# Patient Record
Sex: MALE | Race: WHITE | NOT HISPANIC OR LATINO | Employment: UNEMPLOYED | URBAN - METROPOLITAN AREA
[De-identification: names, ages, dates, MRNs, and addresses within clinical notes are randomized per-mention and may not be internally consistent; named-entity substitution may affect disease eponyms.]

---

## 2020-01-20 PROBLEM — G40.89 OTHER SEIZURES: Status: ACTIVE | Noted: 2020-01-20

## 2024-04-11 PROBLEM — R56.9 SEIZURE-LIKE ACTIVITY: Status: ACTIVE | Noted: 2024-04-11

## 2024-04-12 PROBLEM — G40.909 SEIZURE DISORDER: Status: ACTIVE | Noted: 2020-01-20

## 2024-05-28 ENCOUNTER — TELEPHONE (OUTPATIENT)
Dept: NEUROLOGY | Facility: CLINIC | Age: 39
End: 2024-05-28
Payer: COMMERCIAL

## 2024-05-28 NOTE — TELEPHONE ENCOUNTER
Called the aptient to schedule a new patent appointment with Dr. Randhawa. Pr answered and agreed to come on 6/12 at 3:30 pm.

## 2024-05-31 ENCOUNTER — TELEPHONE (OUTPATIENT)
Dept: NEUROLOGY | Facility: CLINIC | Age: 39
End: 2024-05-31
Payer: COMMERCIAL

## 2024-05-31 NOTE — TELEPHONE ENCOUNTER
Called the pt to notify him that we have to cancel his upcoming apt with Sydnee due to her departure from [facility]. Pt did not answer and was not able to leave a message. I canceled the pt and will reach out to r/s soon

## 2024-06-12 ENCOUNTER — OFFICE VISIT (OUTPATIENT)
Dept: NEUROLOGY | Facility: CLINIC | Age: 39
End: 2024-06-12
Payer: COMMERCIAL

## 2024-06-12 DIAGNOSIS — R56.9 SEIZURE-LIKE ACTIVITY: Primary | ICD-10-CM

## 2024-06-12 DIAGNOSIS — G40.909 SEIZURE DISORDER: ICD-10-CM

## 2024-06-12 PROCEDURE — 1160F RVW MEDS BY RX/DR IN RCRD: CPT | Mod: CPTII,S$GLB,, | Performed by: STUDENT IN AN ORGANIZED HEALTH CARE EDUCATION/TRAINING PROGRAM

## 2024-06-12 PROCEDURE — 99212 OFFICE O/P EST SF 10 MIN: CPT | Mod: S$GLB,,, | Performed by: STUDENT IN AN ORGANIZED HEALTH CARE EDUCATION/TRAINING PROGRAM

## 2024-06-12 PROCEDURE — 99999 PR PBB SHADOW E&M-EST. PATIENT-LVL II: CPT | Mod: PBBFAC,,, | Performed by: STUDENT IN AN ORGANIZED HEALTH CARE EDUCATION/TRAINING PROGRAM

## 2024-06-12 PROCEDURE — 1159F MED LIST DOCD IN RCRD: CPT | Mod: CPTII,S$GLB,, | Performed by: STUDENT IN AN ORGANIZED HEALTH CARE EDUCATION/TRAINING PROGRAM

## 2024-06-12 RX ORDER — CARBAMAZEPINE 300 MG/1
300 CAPSULE, EXTENDED RELEASE ORAL 2 TIMES DAILY
Qty: 180 CAPSULE | Refills: 1 | Status: SHIPPED | OUTPATIENT
Start: 2024-06-12

## 2024-06-12 RX ORDER — LEVETIRACETAM 750 MG/1
750 TABLET ORAL 2 TIMES DAILY
Qty: 180 TABLET | Refills: 1 | Status: SHIPPED | OUTPATIENT
Start: 2024-06-12

## 2024-06-12 NOTE — PATIENT INSTRUCTIONS
VISIT FOLLOW UP    It was nice to see you today.  Here is what we discussed at your visit:     I would like to schedule you for an admission in the epilepsy monitoring unit (EMU).  The EMU is a specialized unit in the hospital that only takes care of patients who have seizures or seizure like events.  The goal of an EMU admission is to record an event to better understand what changes are occurring in the brain's electrical activity.  This allows us to identify whether a patient's events are epileptic seizures or another type of event.  If the events are seizures, we can also learn what area of the brain the seizures are starting from.      EMU admissions are typically 3-5 days, although they can be as long as 7 days.  During the admission, we will typically stop or lower any anti-seizure medications you are taking in order to increase the chance of recording a seizure.  This is done in a safe environment where you are being monitored 24 hours/day, so it is safer to discontinue seizure medications inpatient rather than at home.  I feel this admission is necessary to better understand and treat your seizures.  Our epilepsy nurse will contact you to schedule this admission.     Continue keppra 750 mg twice a day and carbamazepine 300 mg twice a day.  Follow up after EMU admission    Dr. BENJAMIN's contact information: office phone [phone], or contact via [employer]    Seizure precautions:    For emergencies, please call 911 or proceed directly to the nearest emergency room only if you can safely do so.    Seizures may happen at any time. It is important to take certain precautions to maintain your safety.     You should not drive unless you have been cleared by your physicians as well as the [facility].     When possible, take showers instead of baths, as it is possible to drown in even shallow water during a seizure. Do not swim unsupervised or in open water where rescue could be difficult. Do not climb to heights and  do not operate heavy machinery. When cooking, use the back burners of the stove and avoid open flames or hot stove tops. Avoid any activities which could be dangerous in the event of a loss of consciousness.

## 2024-06-12 NOTE — PROGRESS NOTES
[facility]  Epilepsy Clinic Initial Consult Note    [facility] - NEUROLOGY 7TH FL  [facility], [place]    Date: 6/12/24  Patient Name: Abilio Lorenzana   MRN: [id number]   PCP: Aaliyah, Primary Doctor  Referring Provider: Areli Mitchell MD    Assessment:     4 Dimensional Epilepsy Classification  Paroxysmal events  Semiology: (1) Staring episodes (2) Generalized convulsive events  Epileptogenic zone: unknown  Etiology: Epileptic seizures vs nonepileptic spells  Co-morbidities: ADHD       This is Abilio Lorenzana, 38 y.o. male who presents for evaluation of seizure like activity.  He reports a long history of seizure like events that have persisted and even escalated in frequency despite increasing doses of anti seizure medications.  A recent admission during which he had a 24 hour video EEG was normal, although antiseizure medications were not stopped during this EEG.  Due to increasing frequency of seizure-like events as well as uncertain diagnosis with prior EEG studies being normal, and EMU admission is indicated.  Discussed at length with the patient who was agreeable to study.  Referral placed.  We will continue Keppra/Carbamazepine in the meanwhile.  RTC after EMU completed.    Plan:      Problem List Items Addressed This Visit          Neuro    Seizure disorder    Seizure-like activity - Primary    Current Assessment & Plan     Semiology is somewhat limited, however multiple factors (frequent events, prolonged events) are concerning for NEE.  Refer to EMU for further evaluation.                I completed education on seizure first aid and safety. I recommended seizure precautions with regards to avoiding unsupervised water recreational activity or bathing in tubs, climbing or working at heights, operation of heavy or dangerous machinery, caution around fire and sources of high heat, as well as any other activity which could put a patient at danger in case of a seizure.  I  "also reviewed the [place] DMV law and recommended no driving.    Pastora Amaya MD  [facility]   Department of Neurology    Patient note was created using [employer]ict[employer].  Any errors in syntax or even information may not have been identified and edited on initial review prior to signing this note.  Subjective:   Patient seen in consultation at the request of Areli Mitchell MD for the evaluation of seizures. A copy of this note will be sent to the referring physician.          HPI:   Mr. Abilio Lorenzana is a 38 y.o. male who presents with a chief complaint of seizures.  The patient is accompanied at this visit by his mother and sister.      Onset: 2011  Description:   - First episode working in [employer], reports he started to feel lightheaded and short of breath.  He sat down and then remembers falling backwards, then his next memory is waking up in the hospital.  Since that time he has had recurrent episodes at least on a monthly basis and more frequent recently.     - Reports episodes vary in terms of severity.  Sometimes he will have tingling all over his body and then he will lose consciousness.  Episodes can be 60 seconds or 5 minutes long or he can cluster.      - Witnesses have described his body "going like jelly" - falling down to the ground.  No tongue bite or loss of control of bladder    - wakes up disoriented, tries to recall what happened but can't, may remember bits and pieces before the seizure.  He feels disoriented until the next day and exhausted.      - Mother describes: falls down, can't sit up right.  Sometimes knows they are coming on, sometimes he doesn't.  Body can stiffen up and sometimes flail.  Eyes are sometimes open and sometimes closed.  Sometimes he also has staring episodes, like he isn't there, then he goes into convulsions.    Frequency: at least 1x per month.  Frequency has decreased since he stopped working - December 2023 - happening every 2 weeks.  May have up to 20 " consecutive seizures in a day, lasting 60 seconds at a time.    Longest period seizure free: 1-1.5 months  Last seizure: last month.   Last episode he had at doctor's office does not remember at all.  He was [facility] for urgent 24 hour EEG after this episode which was normal.    Seizure Triggers/ Provoking Features: sleep deprivation and stress  Previous Seizure Medications: none prior  Current Seizure Medications: Keppra 750 mg BID - has run out.  Carbamazepine 300 mg BID.      Handedness: left  Seizure Onset Age: 32  Seizure/ Epilepsy Risk Factors: none  Birth/Developmental History: Birth normal, development - ADHD, normal, reports average performance in school  Seizure related injuries: none  Psychiatric/Behavioral Comorbitidies: none diagnosed  Surgical Candidacy:     Social history:  Previously employed at [employer] and more recently [employer]  Stopped working in December due to seizures.    Not driving  Vapes     ROS:   Anxiety - increased stressors related to work  Family hx: myotonic dystrophy in granddaughter and sister and brother.  No hx of epilepsy    Diagnostics:  vEEG 24 hours 4/2024 - normal  MRI brain epilepsy protocol: 1/14/2020 normal      PAST MEDICAL HISTORY:  Past Medical History:   Diagnosis Date    Seizures        PAST SURGICAL HISTORY:  No past surgical history on file.    CURRENT MEDS:  Current Outpatient Medications   Medication Sig Dispense Refill    carBAMazepine (CARBATROL) 300 MG CM12 Take 300 mg by mouth 2 (two) times daily.      levETIRAcetam (KEPPRA) 750 MG Tab Take 750 mg by mouth 2 (two) times daily.       No current facility-administered medications for this visit.       ALLERGIES:  Review of patient's allergies indicates:  No Known Allergies    FAMILY HISTORY:  No family history on file.    SOCIAL HISTORY:  Social History     Tobacco Use    Smoking status: Every Day     Current packs/day: 0.25     Types: Cigarettes    Smokeless tobacco: Never    Substance Use Topics    Alcohol use: Not Currently        Review of Systems:  12 system review of systems is negative except for the symptoms mentioned in HPI.        Objective:   There were no vitals filed for this visit.    General: NAD, well nourished   Eyes: no tearing, discharge, no erythema   ENT: moist mucous membranes of the oral cavity, nares patent    Neck: Supple, Full range of motion  Cardiovascular: Warm and well perfused, pulses equal and symmetrical  Lungs: Normal work of breathing, normal chest wall excursions  Skin: No rash, lesions, or breakdown on exposed skin  Psychiatry: Mood and affect are appropriate   Abdomen: soft, non tender, non distended  Extremeties: No cyanosis, clubbing or edema.    Neurological   MENTAL STATUS: Alert and oriented to person, place, and time. Attention and concentration within normal limits. Speech without dysarthria, able to name and repeat without difficulty. Recent and remote memory within normal limits   CRANIAL NERVES: Visual fields intact. PERRL. EOMI. Facial sensation intact. Face symmetrical. Hearing grossly intact. Full shoulder shrug bilaterally. Tongue protrudes midline   SENSORY: Sensation is intact to light touch throughout.  Joint position perception intact. Negative Romberg.   MOTOR: Normal bulk and tone. No pronator drift.  5/5 deltoid, biceps, triceps, interosseous, hand  bilaterally. 5/5 iliopsoas, knee extension/flexion, foot dorsi/plantarflexion bilaterally.    REFLEXES: Symmetric and 2+ throughout. Toes down going bilaterally.   CEREBELLAR/COORDINATION/GAIT: Gait steady with normal arm swing and stride length.  Heel to shin intact. Finger to nose intact. Normal rapid alternating movements.

## 2024-06-12 NOTE — ASSESSMENT & PLAN NOTE
Semiology is somewhat limited, however multiple factors (frequent events, prolonged events) are concerning for NEE.  Refer to EMU for further evaluation.

## 2024-07-01 ENCOUNTER — PATIENT MESSAGE (OUTPATIENT)
Dept: NEUROLOGY | Facility: CLINIC | Age: 39
End: 2024-07-01
Payer: COMMERCIAL

## 2024-07-02 ENCOUNTER — TELEPHONE (OUTPATIENT)
Dept: NEUROLOGY | Facility: CLINIC | Age: 39
End: 2024-07-02
Payer: COMMERCIAL

## 2024-07-02 DIAGNOSIS — R56.9 SEIZURE-LIKE ACTIVITY: Primary | ICD-10-CM

## 2024-07-02 NOTE — TELEPHONE ENCOUNTER
----- Message from Brittaney Nichole RN sent at 7/2/2024 11:19 AM CDT -----  Regarding: RE: EEG  There are no EMU orders on this patient, only an ambulatory  ----- Message -----  From: Jacquie Orozco  Sent: 7/2/2024  11:12 AM CDT  To: Brittaney Nichole RN  Subject: RE: EEG                                          Hey,    This looks like EMU as well according to Dr BENJAMIN's last clinic note.    Jacquie  ----- Message -----  From: Lola Dlaey MA  Sent: 7/2/2024   9:56 AM CDT  To: Jacquie Orozco  Subject: FW: EEG                                          Please Advise.  Pt need AMB EEG.  ----- Message -----  From: Brittaney Nichole RN  Sent: 7/2/2024   7:22 AM CDT  To: Lola Daley MA  Subject: RE: EEG                                          I do not schedule EEGs. If it's ambulatory which is what looks like is ordered, you should send to [facility]  ----- Message -----  From: Lola Daley MA  Sent: 7/1/2024   1:23 PM CDT  To: Brittaney Nichole RN  Subject: EEG                                              pT NEEDS AN eeg SCHEDULED ANSWERING SERVICE SENT TO ME BUT I CAN'T HEKP HIM.

## 2024-07-22 ENCOUNTER — TELEPHONE (OUTPATIENT)
Dept: NEUROLOGY | Facility: CLINIC | Age: 39
End: 2024-07-22
Payer: COMMERCIAL

## 2024-07-22 DIAGNOSIS — R56.9 SEIZURE-LIKE ACTIVITY: Primary | ICD-10-CM

## 2024-07-22 NOTE — TELEPHONE ENCOUNTER
Scheduled EMU via patient's mother, 8/6/24, 1pm. Aware an adult is required to accompany him for the duration including overnight. Aware he will be connected to lines to his head, chest, arm, have an IV placed; will not be able to leave the room until all equipment is removed at discharge. She reports he does smoke/vape; is aware there is absolutely no smoking or vaping in the hospital and can request a nicotine patch. Instructions thoroughly discussed; mailed via [employer]

## 2024-08-06 ENCOUNTER — HOSPITAL ENCOUNTER (INPATIENT)
Facility: HOSPITAL | Age: 39
LOS: 6 days | Discharge: HOME | DRG: 880 | End: 2024-08-12
Attending: STUDENT IN AN ORGANIZED HEALTH CARE EDUCATION/TRAINING PROGRAM | Admitting: PSYCHIATRY & NEUROLOGY
Payer: COMMERCIAL

## 2024-08-06 ENCOUNTER — DOCUMENTATION ONLY (OUTPATIENT)
Dept: NEUROLOGY | Facility: CLINIC | Age: 39
End: 2024-08-06
Payer: COMMERCIAL

## 2024-08-06 DIAGNOSIS — R56.9 SEIZURE: ICD-10-CM

## 2024-08-06 DIAGNOSIS — F44.9 CONVERSION DISORDER: Primary | ICD-10-CM

## 2024-08-06 DIAGNOSIS — R56.9 SEIZURE-LIKE ACTIVITY: ICD-10-CM

## 2024-08-06 PROBLEM — F17.200 NICOTINE DEPENDENCE: Status: ACTIVE | Noted: 2024-08-06

## 2024-08-06 LAB
ALBUMIN SERPL BCP-MCNC: 3.8 G/DL (ref 3.5–5.2)
ALP SERPL-CCNC: 51 U/L (ref 55–135)
ALT SERPL W/O P-5'-P-CCNC: 12 U/L (ref 10–44)
AMPHET+METHAMPHET UR QL: NEGATIVE
ANION GAP SERPL CALC-SCNC: 6 MMOL/L (ref 8–16)
AST SERPL-CCNC: 15 U/L (ref 10–40)
BARBITURATES UR QL SCN>200 NG/ML: NEGATIVE
BASOPHILS # BLD AUTO: 0.04 K/UL (ref 0–0.2)
BASOPHILS NFR BLD: 0.8 % (ref 0–1.9)
BENZODIAZ UR QL SCN>200 NG/ML: NEGATIVE
BILIRUB SERPL-MCNC: 0.5 MG/DL (ref 0.1–1)
BUN SERPL-MCNC: 8 MG/DL (ref 6–20)
BZE UR QL SCN: NEGATIVE
CALCIUM SERPL-MCNC: 9.2 MG/DL (ref 8.7–10.5)
CANNABINOIDS UR QL SCN: NEGATIVE
CARBAMAZEPINE SERPL-MCNC: <1.9 UG/ML (ref 4–12)
CHLORIDE SERPL-SCNC: 112 MMOL/L (ref 95–110)
CO2 SERPL-SCNC: 25 MMOL/L (ref 23–29)
CREAT SERPL-MCNC: 0.8 MG/DL (ref 0.5–1.4)
CREAT UR-MCNC: 42 MG/DL (ref 23–375)
DIFFERENTIAL METHOD BLD: ABNORMAL
EOSINOPHIL # BLD AUTO: 0.1 K/UL (ref 0–0.5)
EOSINOPHIL NFR BLD: 1.2 % (ref 0–8)
ERYTHROCYTE [DISTWIDTH] IN BLOOD BY AUTOMATED COUNT: 12 % (ref 11.5–14.5)
EST. GFR  (NO RACE VARIABLE): >60 ML/MIN/1.73 M^2
ETHANOL UR-MCNC: <10 MG/DL
GLUCOSE SERPL-MCNC: 79 MG/DL (ref 70–110)
HCT VFR BLD AUTO: 40.8 % (ref 40–54)
HGB BLD-MCNC: 12.9 G/DL (ref 14–18)
IMM GRANULOCYTES # BLD AUTO: 0.02 K/UL (ref 0–0.04)
IMM GRANULOCYTES NFR BLD AUTO: 0.4 % (ref 0–0.5)
LYMPHOCYTES # BLD AUTO: 1.3 K/UL (ref 1–4.8)
LYMPHOCYTES NFR BLD: 25.3 % (ref 18–48)
MAGNESIUM SERPL-MCNC: 2.1 MG/DL (ref 1.6–2.6)
MCH RBC QN AUTO: 32.3 PG (ref 27–31)
MCHC RBC AUTO-ENTMCNC: 31.6 G/DL (ref 32–36)
MCV RBC AUTO: 102 FL (ref 82–98)
METHADONE UR QL SCN>300 NG/ML: NEGATIVE
MONOCYTES # BLD AUTO: 0.3 K/UL (ref 0.3–1)
MONOCYTES NFR BLD: 6.7 % (ref 4–15)
NEUTROPHILS # BLD AUTO: 3.2 K/UL (ref 1.8–7.7)
NEUTROPHILS NFR BLD: 65.6 % (ref 38–73)
NRBC BLD-RTO: 0 /100 WBC
OHS QRS DURATION: 112 MS
OHS QTC CALCULATION: 390 MS
OPIATES UR QL SCN: NEGATIVE
PCP UR QL SCN>25 NG/ML: NEGATIVE
PHOSPHATE SERPL-MCNC: 2.7 MG/DL (ref 2.7–4.5)
PLATELET # BLD AUTO: 227 K/UL (ref 150–450)
PMV BLD AUTO: 9.7 FL (ref 9.2–12.9)
POTASSIUM SERPL-SCNC: 3.9 MMOL/L (ref 3.5–5.1)
PROT SERPL-MCNC: 6 G/DL (ref 6–8.4)
RBC # BLD AUTO: 4 M/UL (ref 4.6–6.2)
SODIUM SERPL-SCNC: 143 MMOL/L (ref 136–145)
TOXICOLOGY INFORMATION: NORMAL
WBC # BLD AUTO: 4.94 K/UL (ref 3.9–12.7)

## 2024-08-06 PROCEDURE — 80156 ASSAY CARBAMAZEPINE TOTAL: CPT

## 2024-08-06 PROCEDURE — 93010 ELECTROCARDIOGRAM REPORT: CPT | Mod: ,,, | Performed by: INTERNAL MEDICINE

## 2024-08-06 PROCEDURE — 85025 COMPLETE CBC W/AUTO DIFF WBC: CPT

## 2024-08-06 PROCEDURE — 95720 EEG PHY/QHP EA INCR W/VEEG: CPT | Mod: ,,, | Performed by: PSYCHIATRY & NEUROLOGY

## 2024-08-06 PROCEDURE — 84100 ASSAY OF PHOSPHORUS: CPT

## 2024-08-06 PROCEDURE — 95700 EEG CONT REC W/VID EEG TECH: CPT

## 2024-08-06 PROCEDURE — 36415 COLL VENOUS BLD VENIPUNCTURE: CPT

## 2024-08-06 PROCEDURE — 11000001 HC ACUTE MED/SURG PRIVATE ROOM

## 2024-08-06 PROCEDURE — 83735 ASSAY OF MAGNESIUM: CPT

## 2024-08-06 PROCEDURE — 80053 COMPREHEN METABOLIC PANEL: CPT

## 2024-08-06 PROCEDURE — 80307 DRUG TEST PRSMV CHEM ANLYZR: CPT

## 2024-08-06 PROCEDURE — 95714 VEEG EA 12-26 HR UNMNTR: CPT

## 2024-08-06 PROCEDURE — 80177 DRUG SCRN QUAN LEVETIRACETAM: CPT

## 2024-08-06 PROCEDURE — 93005 ELECTROCARDIOGRAM TRACING: CPT

## 2024-08-06 PROCEDURE — 25000003 PHARM REV CODE 250

## 2024-08-06 RX ORDER — ACETAMINOPHEN 325 MG/1
650 TABLET ORAL EVERY 4 HOURS PRN
Status: DISCONTINUED | OUTPATIENT
Start: 2024-08-06 | End: 2024-08-12 | Stop reason: HOSPADM

## 2024-08-06 RX ORDER — IBUPROFEN 200 MG
1 TABLET ORAL DAILY
Status: DISCONTINUED | OUTPATIENT
Start: 2024-08-07 | End: 2024-08-12 | Stop reason: HOSPADM

## 2024-08-06 RX ORDER — CARBAMAZEPINE 100 MG/1
100 CAPSULE, EXTENDED RELEASE ORAL 2 TIMES DAILY
Status: DISCONTINUED | OUTPATIENT
Start: 2024-08-06 | End: 2024-08-07

## 2024-08-06 RX ORDER — DOCUSATE SODIUM 100 MG/1
100 CAPSULE, LIQUID FILLED ORAL 2 TIMES DAILY PRN
Status: DISCONTINUED | OUTPATIENT
Start: 2024-08-06 | End: 2024-08-12 | Stop reason: HOSPADM

## 2024-08-06 RX ORDER — SODIUM CHLORIDE 0.9 % (FLUSH) 0.9 %
10 SYRINGE (ML) INJECTION
Status: DISCONTINUED | OUTPATIENT
Start: 2024-08-06 | End: 2024-08-12 | Stop reason: HOSPADM

## 2024-08-06 RX ORDER — ONDANSETRON 8 MG/1
8 TABLET, ORALLY DISINTEGRATING ORAL EVERY 8 HOURS PRN
Status: DISCONTINUED | OUTPATIENT
Start: 2024-08-06 | End: 2024-08-12 | Stop reason: HOSPADM

## 2024-08-06 RX ADMIN — CARBAMAZEPINE 100 MG: 100 CAPSULE, EXTENDED RELEASE ORAL at 09:08

## 2024-08-06 NOTE — NURSING
Patient Transferred to NPU Room 905       Upon arrival to the floor, patient greeted and oriented to room. Complete head to toe assessment completed per protocol. VSS, see flowsheet for details. Neuro assessment completed. Primary team notified of patient's transfer to floor. All current and transfer orders reviewed/reconciled per protocol. All emergency equipment set up in patient's room. Fall/seizure precautions initiated per providers orders. 4 Eyes skin assessment performed, see below for details. Reviewed assessment and rounding frequency with patient and family. Questions were encouraged and addressed. Repositioned patient for comfort with bed locked in lowest position, side rails up x 4, bed alarm set, and call light within reach. Instructed patient to call staff for mobility/assistance, verbalized understanding. No acute signs of distress noted at this time.           +++++ Special Considerations+++++++    Nurses Note -- 4 Eyes      8/6/2024   1:29 PM      Skin assessed during: Admit      [x] No Altered Skin Integrity Present    []Prevention Measures Documented      [] Yes- Altered Skin Integrity Present or Discovered   [] LDA Added if Not in Epic (Describe Wound)   [] New Altered Skin Integrity was Present on Admit and Documented in LDA   [] Wound Image Taken    Wound Care Consulted? No    Attending Nurse:  Rohit Mcmahan RN/Staff Member:  John

## 2024-08-06 NOTE — ASSESSMENT & PLAN NOTE
"Abilio Lorenzana is a 38 year old male with a history of smoking who presents per Dr. Amaya for capture of seizure events and optimization of medications. He experiences 1-2 episodes per month where he feels "light headed" and will proceed to pass out and convulse. Prior EEG 4/2024 was normal as well as MRI 1/2020. He is currently on keppra 750 mg BID and carbamazepine 300 mg BID.     Plan:  - Continuous EEG monitoring  - Discontinue keppra  - Continue carbamazepine at 100 mg BID  - CBC, CMP, Mg, P, Utox, AED levels, EKG x 1  - For seizure > 5 minute notify epilepsy on call  - Ativan 2 mg prn  - Oxygen and suction at bedside  - Continuous pulse oximetry   - Telemetry   "

## 2024-08-06 NOTE — NURSING
EMU NURSING INTERVIEW  Pt admitted to EMU room ---, EMU team notified.  Onset-When did your seizures start? 2011  Aura-Do you experience an aura? yes  Symptoms-What symptoms do you experience? Tingling, vision changes, convulsions  Triggers-Do you have any Triggers? unknown  Do you bite your tongue? no  Do become incontinent of bladder or bowels? no  Have you been told your BP or oxygen drops during an event? no    Bed locked, bed alarm on and call light in reach. Educated to call staff before ambulating. ducated to use event button when patient feels like they will have an event or when an event is suspected. Pt and family verbalize understanding.

## 2024-08-06 NOTE — HPI
"Abilio Lorenzana is a 38 year old male with a history of smoking who presents per Dr. Amaya for capture of seizure events and optimization of medications. His first event was back in 2011 when working at [employer]. At the time he felt "lightheaded" and short of breath. He sat down and fell back and the next thing he remembered was waking up in the hospital. Since then he experiences 1-2 events per month. Sometimes he feels tingling all over his body and he is confused until the next day. He denies tongue biting or bowel/bladder incontinence. The last episode was July 26th. He has an EEG 4/2024 which was normal. MRI brain epilepsy protocol 1/14/2020 without acute findings other than left maxillary sinusitis. He is currently on keppra 750 mg BID and carbamazepine 300 mg BID. He took his medications on the morning of arrival.   "

## 2024-08-06 NOTE — H&P
"[facility] ([facility])  Neurology-Epilepsy  History & Physical    Patient Name: Abilio Lorenzana  MRN: [id number]   Admission Date: 8/6/2024  Code Status: Full Code   Attending Provider: Tyree Oneill MD   Primary Care Physician: Aaliyah, Primary Doctor  Principal Problem:Seizure-like activity    Subjective:     Chief Complaint:  Seizure like events     HPI:   Abilio Lorenzana is a 38 year old male with a history of smoking who presents per Dr. Amaya for capture of seizure events and optimization of medications. His first event was back in 2011 when working at [employer]. At the time he felt "lightheaded" and short of breath. He sat down and fell back and the next thing he remembered was waking up in the hospital. Since then he experiences 1-2 events per month. Sometimes he feels tingling all over his body and he is confused until the next day. He denies tongue biting or bowel/bladder incontinence. The last episode was July 26th. He has an EEG 4/2024 which was normal. MRI brain epilepsy protocol 1/14/2020 without acute findings other than left maxillary sinusitis. He is currently on keppra 750 mg BID and carbamazepine 300 mg BID. He took his medications on the morning of arrival.     Past Medical History:   Diagnosis Date    Seizures        No past surgical history on file.    Review of patient's allergies indicates:  No Known Allergies    No current facility-administered medications on file prior to encounter.     Current Outpatient Medications on File Prior to Encounter   Medication Sig    carBAMazepine (CARBATROL) 300 MG CM12 Take 1 capsule (300 mg total) by mouth 2 (two) times daily.    levETIRAcetam (KEPPRA) 750 MG Tab Take 1 tablet (750 mg total) by mouth 2 (two) times daily.     Continuous Infusions:    Family History    None       Tobacco Use    Smoking status: Every Day     Current packs/day: 0.25     Types: Cigarettes    Smokeless tobacco: Never   Substance and Sexual Activity    Alcohol use: Not " Currently    Drug use: Not on file    Sexual activity: Not on file     Review of Systems   Constitutional:  Negative for chills and fever.   HENT:  Negative for rhinorrhea and sneezing.    Respiratory:  Negative for cough and shortness of breath.    Cardiovascular:  Negative for chest pain and leg swelling.   Gastrointestinal:  Negative for abdominal pain and nausea.   Genitourinary:  Negative for dysuria and hematuria.   Musculoskeletal:  Negative for arthralgias and myalgias.   Neurological:  Positive for seizures. Negative for tremors and headaches.   Psychiatric/Behavioral:  Negative for agitation and confusion.      Objective:     Vital Signs (Most Recent):  Temp: 97.8 °F (36.6 °C) (08/06/24 1550)  Pulse: (!) 46 (08/06/24 1550)  Resp: 12 (08/06/24 1550)  BP: 99/60 (08/06/24 1550)  SpO2: 100 % (08/06/24 1550) Vital Signs (24h Range):  Temp:  [97.6 °F (36.4 °C)-97.8 °F (36.6 °C)] 97.8 °F (36.6 °C)  Pulse:  [46-60] 46  Resp:  [12-16] 12  SpO2:  [98 %-100 %] 100 %  BP: ()/(60-63) 99/60     Weight: 60.2 kg (132 lb 11.5 oz)  Body mass index is 19.6 kg/m².     Physical Exam  Vitals and nursing note reviewed.   Constitutional:       General: He is not in acute distress.  HENT:      Head: Normocephalic and atraumatic.      Nose: Nose normal. No rhinorrhea.   Eyes:      Extraocular Movements: Extraocular movements intact.      Conjunctiva/sclera: Conjunctivae normal.   Cardiovascular:      Rate and Rhythm: Normal rate and regular rhythm.   Pulmonary:      Effort: Pulmonary effort is normal. No respiratory distress.   Abdominal:      General: There is no distension.      Palpations: Abdomen is soft.   Musculoskeletal:      Right lower leg: No edema.      Left lower leg: No edema.   Skin:     General: Skin is warm and dry.      Capillary Refill: Capillary refill takes less than 2 seconds.   Neurological:      Mental Status: He is alert and oriented to person, place, and time.      Cranial Nerves: Cranial nerves 2-12  "are intact.      Deep Tendon Reflexes:      Reflex Scores:       Patellar reflexes are 2+ on the right side and 2+ on the left side.           NEUROLOGICAL EXAMINATION:     MENTAL STATUS   Oriented to person, place, and time.     CRANIAL NERVES   Cranial nerves II through XII intact.     MOTOR EXAM   Overall muscle tone: normal    REFLEXES     Reflexes   Right patellar: 2+  Left patellar: 2+    SENSORY EXAM   Light touch normal.     GAIT AND COORDINATION     Tremor   Resting tremor: absent      Significant Labs: CBC:   Recent Labs   Lab 08/06/24  1542   WBC 4.94   HGB 12.9*   HCT 40.8        CMP: No results for input(s): "GLU", "NA", "K", "CL", "CO2", "BUN", "CREATININE", "CALCIUM", "MG", "PROT", "ALBUMIN", "BILITOT", "ALKPHOS", "AST", "ALT", "ANIONGAP", "EGFRNONAA" in the last 48 hours.    Significant Studies: EEG: I have reviewed all pertinent results/findings within the past 24 hours  Assessment and Plan:     * Seizure-like activity  Abilio Lorenzana is a 38 year old male with a history of smoking who presents per Dr. Amaya for capture of seizure events and optimization of medications. He experiences 1-2 episodes per month where he feels "light headed" and will proceed to pass out and convulse. Prior EEG 4/2024 was normal as well as MRI 1/2020. He is currently on keppra 750 mg BID and carbamazepine 300 mg BID.     Plan:  - Continuous EEG monitoring  - Discontinue keppra  - Continue carbamazepine at 100 mg BID  - CBC, CMP, Mg, P, Utox, AED levels, EKG x 1  - For seizure > 5 minute notify epilepsy on call  - Ativan 2 mg prn  - Oxygen and suction at bedside  - Continuous pulse oximetry   - Telemetry     Nicotine dependence  - Discussed smoking cessation and will start a nicotine patch while admitted.         VTE Risk Mitigation (From admission, onward)           Ordered     Place sequential compression device  Until discontinued         08/06/24 1329     IP VTE LOW RISK PATIENT  Once         08/06/24 " 3482                    Loy Strong MD  Neurology-Epilepsy  [facility] ([facility])

## 2024-08-06 NOTE — SUBJECTIVE & OBJECTIVE
Past Medical History:   Diagnosis Date    Seizures        No past surgical history on file.    Review of patient's allergies indicates:  No Known Allergies    No current facility-administered medications on file prior to encounter.     Current Outpatient Medications on File Prior to Encounter   Medication Sig    carBAMazepine (CARBATROL) 300 MG CM12 Take 1 capsule (300 mg total) by mouth 2 (two) times daily.    levETIRAcetam (KEPPRA) 750 MG Tab Take 1 tablet (750 mg total) by mouth 2 (two) times daily.     Continuous Infusions:    Family History    None       Tobacco Use    Smoking status: Every Day     Current packs/day: 0.25     Types: Cigarettes    Smokeless tobacco: Never   Substance and Sexual Activity    Alcohol use: Not Currently    Drug use: Not on file    Sexual activity: Not on file     Review of Systems   Constitutional:  Negative for chills and fever.   HENT:  Negative for rhinorrhea and sneezing.    Respiratory:  Negative for cough and shortness of breath.    Cardiovascular:  Negative for chest pain and leg swelling.   Gastrointestinal:  Negative for abdominal pain and nausea.   Genitourinary:  Negative for dysuria and hematuria.   Musculoskeletal:  Negative for arthralgias and myalgias.   Neurological:  Positive for seizures. Negative for tremors and headaches.   Psychiatric/Behavioral:  Negative for agitation and confusion.      Objective:     Vital Signs (Most Recent):  Temp: 97.8 °F (36.6 °C) (08/06/24 1550)  Pulse: (!) 46 (08/06/24 1550)  Resp: 12 (08/06/24 1550)  BP: 99/60 (08/06/24 1550)  SpO2: 100 % (08/06/24 1550) Vital Signs (24h Range):  Temp:  [97.6 °F (36.4 °C)-97.8 °F (36.6 °C)] 97.8 °F (36.6 °C)  Pulse:  [46-60] 46  Resp:  [12-16] 12  SpO2:  [98 %-100 %] 100 %  BP: ()/(60-63) 99/60     Weight: 60.2 kg (132 lb 11.5 oz)  Body mass index is 19.6 kg/m².     Physical Exam  Vitals and nursing note reviewed.   Constitutional:       General: He is not in acute distress.  HENT:      Head:  "Normocephalic and atraumatic.      Nose: Nose normal. No rhinorrhea.   Eyes:      Extraocular Movements: Extraocular movements intact.      Conjunctiva/sclera: Conjunctivae normal.   Cardiovascular:      Rate and Rhythm: Normal rate and regular rhythm.   Pulmonary:      Effort: Pulmonary effort is normal. No respiratory distress.   Abdominal:      General: There is no distension.      Palpations: Abdomen is soft.   Musculoskeletal:      Right lower leg: No edema.      Left lower leg: No edema.   Skin:     General: Skin is warm and dry.      Capillary Refill: Capillary refill takes less than 2 seconds.   Neurological:      Mental Status: He is alert and oriented to person, place, and time.      Cranial Nerves: Cranial nerves 2-12 are intact.      Deep Tendon Reflexes:      Reflex Scores:       Patellar reflexes are 2+ on the right side and 2+ on the left side.           NEUROLOGICAL EXAMINATION:     MENTAL STATUS   Oriented to person, place, and time.     CRANIAL NERVES   Cranial nerves II through XII intact.     MOTOR EXAM   Overall muscle tone: normal    REFLEXES     Reflexes   Right patellar: 2+  Left patellar: 2+    SENSORY EXAM   Light touch normal.     GAIT AND COORDINATION     Tremor   Resting tremor: absent      Significant Labs: CBC:   Recent Labs   Lab 08/06/24  1542   WBC 4.94   HGB 12.9*   HCT 40.8        CMP: No results for input(s): "GLU", "NA", "K", "CL", "CO2", "BUN", "CREATININE", "CALCIUM", "MG", "PROT", "ALBUMIN", "BILITOT", "ALKPHOS", "AST", "ALT", "ANIONGAP", "EGFRNONAA" in the last 48 hours.    Significant Studies: EEG: I have reviewed all pertinent results/findings within the past 24 hours  "

## 2024-08-07 PROCEDURE — 94761 N-INVAS EAR/PLS OXIMETRY MLT: CPT

## 2024-08-07 PROCEDURE — 25000003 PHARM REV CODE 250

## 2024-08-07 PROCEDURE — 95714 VEEG EA 12-26 HR UNMNTR: CPT

## 2024-08-07 PROCEDURE — A4216 STERILE WATER/SALINE, 10 ML: HCPCS

## 2024-08-07 PROCEDURE — 95720 EEG PHY/QHP EA INCR W/VEEG: CPT | Mod: ,,, | Performed by: PSYCHIATRY & NEUROLOGY

## 2024-08-07 PROCEDURE — S4991 NICOTINE PATCH NONLEGEND: HCPCS

## 2024-08-07 PROCEDURE — 11000001 HC ACUTE MED/SURG PRIVATE ROOM

## 2024-08-07 RX ORDER — LORAZEPAM 2 MG/ML
2 INJECTION INTRAMUSCULAR
Status: DISCONTINUED | OUTPATIENT
Start: 2024-08-07 | End: 2024-08-12 | Stop reason: HOSPADM

## 2024-08-07 RX ORDER — DIPHENHYDRAMINE HCL 50 MG
50 CAPSULE ORAL ONCE
Status: COMPLETED | OUTPATIENT
Start: 2024-08-08 | End: 2024-08-08

## 2024-08-07 RX ORDER — TRAMADOL HYDROCHLORIDE 50 MG/1
50 TABLET ORAL ONCE
Status: COMPLETED | OUTPATIENT
Start: 2024-08-08 | End: 2024-08-08

## 2024-08-07 RX ADMIN — Medication 10 ML: at 08:08

## 2024-08-07 RX ADMIN — CARBAMAZEPINE 100 MG: 100 CAPSULE, EXTENDED RELEASE ORAL at 08:08

## 2024-08-07 RX ADMIN — NICOTINE 1 PATCH: 14 PATCH, EXTENDED RELEASE TRANSDERMAL at 08:08

## 2024-08-07 NOTE — PROCEDURES
DATE: 8/6/24    EEG NUMBER:  [id number] -[id number]    REFERRING PHYSICIAN:  Dr. Amaya     This EEG was performed to assess for evidence of underlying epilepsy.     ELECTROENCEPHALOGRAM REPORT     METHODOLOGY:  Electroencephalographic (EEG) recording is with electrodes placed according to the International 10-20 placement system.  Thirty two (32) channels of digital signal are simultaneously recorded from the scalp and may include EKG, EMG, and/or eye monitors.   Recording band pass was 0.1 to 512 hz.  Digital video recording of the patient is simultaneously recorded with the EEG.  The staff report clinical symptoms and may press an event button when the patient has symptoms of clinical interest to the treating physicians.  EEG and video recording is stored and archived in digital format.  The entire recording is visually reviewed, and the times identified by computer analysis as being spikes or seizures are reviewed again.  Activation procedures which include photic stimulation, hyperventilation and instructing patients to perform simple task are done in selected patients.   Compresses spectral analysis (CSA) is also performed on the activity recorded from each individual channel.  This is displayed as a power display of frequencies from 0 to 30 Hz over time.   The CSA analysis is done and displayed continuously.  This is reviewed for asymmetries in power between homologous areas of the scalp and for presence of changes in power which can be seen when seizures occur.  Sections of suspected abnormalities on the CSA is then compared with the original EEG recording.                [employer] software was also utilized in the review of this study.  This software suite analyzes the EEG recording in multiple domains.  Coherence and rhythmicity is computed to identify EEG sections which may contain organized seizures.  Each channel undergoes analysis to detect presence of spike and sharp waves which have special and  morphological characteristic of epileptic activity.  The routine EEG recording is converted from spacial into frequency domain.  This is then displayed comparing homologous areas to identify areas of significant asymmetry.  Algorithm to identify non-cortically generated artifact is used to separate eye movement, EMG and other artifact from the EEG.     Recording times   Start August 6, 2024 at hour 15 minute 3 seconds 31   End on August 7, 2024 at hours 7 minute 0 seconds 5   The total time of EEG recording for the study was 15 hours and 43 minutes    EEG FINDINGS:  The recording was obtained with a number of standard bipolar and referential montages during wakefulness, drowsiness and sleep.  In the alert state, the posterior background rhythm was a symmetric, well-modulated 9 to 10 Hz alpha rhythm, which reacted symmetrically to eye opening.  Activation procedures were not performed During drowsiness, the background rhythm waxed and waned and there were periods of slowing.  During stage II sleep, symmetric V waves and sleep spindles were noted.  There were no focal abnormalities.  There were no interictal epileptiform abnormalities and no clinical or electrographic seizures were recorded.    The EKG channel revealed a sinus rhythm.     IMPRESSION:  This is a normal EEG during wakefulness, drowsiness and sleep.     CLINICAL CORRELATION:  The patient is a  38-year-old male who is being evaluated for episodes of loss of consciousness.  The patient is currently maintained on carbamazepine This is a normal EEG during wakefulness, drowsiness and sleep.  There is no evidence for neither cortical dysfunction nor an epileptic process on this recording.  No seizures were recorded during this study.

## 2024-08-07 NOTE — PLAN OF CARE
[facility] (H[facility])  Initial Discharge Assessment       Primary Care Provider: Aaliyah, Primary Doctor    Admission Diagnosis: Seizure-like activity [R56.9]    Admission Date: 8/6/2024  Expected Discharge Date:     Transition of Care Barriers: (P) None    Payor: BLUE CROSS BLUE SHIELD / Plan: [employer] [place] HMO / Product Type: HMO /     Extended Emergency Contact Information  Primary Emergency Contact: CHINA AZEVEDO  Mobile Phone: [phone]  Relation: Mother  Preferred language: English   needed? No    Discharge Plan A: (P) Home with family  Discharge Plan B: (P) Home with family, Home Health      [id number] - [place], [place] - [address]   [id number]   [place] [address]  Phone: [phone] Fax: [phone]      Initial Assessment (most recent)       Adult Discharge Assessment - 08/07/24 1052          Discharge Assessment    Assessment Type Discharge Planning Assessment     Confirmed/corrected address, phone number and insurance Yes     Confirmed Demographics Correct on Facesheet     Source of Information patient;family     When was your last doctors appointment? 06/12/24 (P)      Communicated TATIANA with patient/caregiver Date not available/Unable to determine (P)      Reason For Admission Epilespy Monitoring (P)      People in Home parent(s);sibling(s) (P)      Do you expect to return to your current living situation? Yes (P)      Do you have help at home or someone to help you manage your care at home? Yes (P)      Who are your caregiver(s) and their phone number(s)? Brianna Azevedo [phone] (P)      Prior to hospitilization cognitive status: Alert/Oriented (P)      Current cognitive status: Alert/Oriented (P)      Walking or Climbing Stairs Difficulty no (P)      Dressing/Bathing Difficulty no (P)      Equipment Currently Used at Home none (P)      Readmission within 30 days? No (P)      Patient currently being followed by outpatient case management? No (P)      Do you currently have  service(s) that help you manage your care at home? No (P)      Do you take prescription medications? Yes (P)      Do you have prescription coverage? Yes (P)      Coverage [place] (P)      Do you have any problems affording any of your prescribed medications? No (P)      Is the patient taking medications as prescribed? yes (P)      Who is going to help you get home at discharge? Mother Kenya (P)      How do you get to doctors appointments? car, drives self;family or friend will provide (P)      Are you on dialysis? No (P)      Do you take coumadin? No (P)      Discharge Plan A Home with family (P)      Discharge Plan B Home with family;Home Health (P)      DME Needed Upon Discharge  none (P)      Discharge Plan discussed with: Patient;Parent(s) (P)      Transition of Care Barriers None (P)         Physical Activity    On average, how many days per week do you engage in moderate to strenuous exercise (like a brisk walk)? 4 days (P)      On average, how many minutes do you engage in exercise at this level? 30 min (P)         Financial Resource Strain    How hard is it for you to pay for the very basics like food, housing, medical care, and heating? Not very hard (P)         Housing Stability    In the last 12 months, was there a time when you were not able to pay the mortgage or rent on time? No (P)      At any time in the past 12 months, were you homeless or living in a shelter (including now)? No (P)         Transportation Needs    Has the lack of transportation kept you from medical appointments, meetings, work or from getting things needed for daily living? No (P)         Food Insecurity    Within the past 12 months, you worried that your food would run out before you got the money to buy more. Never true (P)      Within the past 12 months, the food you bought just didn't last and you didn't have money to get more. Never true (P)         Stress    Do you feel stress - tense, restless, nervous, or anxious, or  unable to sleep at night because your mind is troubled all the time - these days? To some extent (P)         Social Isolation    How often do you feel lonely or isolated from those around you?  Sometimes (P)         Alcohol Use    Q1: How often do you have a drink containing alcohol? Monthly or less (P)      Q2: How many drinks containing alcohol do you have on a typical day when you are drinking? 1 or 2 (P)      Q3: How often do you have six or more drinks on one occasion? Never (P)         Utilities    In the past 12 months has the electric, gas, oil, or water company threatened to shut off services in your home? No (P)         Health Literacy    How often do you need to have someone help you when you read instructions, pamphlets, or other written material from your doctor or pharmacy? Rarely (P)         OTHER    Name(s) of People in Home Mother, stepfather and brother (P)                    SW met with patient and his mother Kenya at bedside.  Patient resides with his mother, stepfather and brother in a 2nd story home with 17 steps.  Patient is independent with ambulation and ADL's and has not HH or DME needs.  Patient is not on HD or Coumadin.     Patient will discharge home with family.  Mom can provide transportation.      Discharge Plan A and Plan B have been determined by review of patient's clinical status, future medical and therapeutic needs, and coverage/benefits for post-acute care in coordination with multidisciplinary team members.    Brandie Wellington, MIRANDA  [facility]  [phone]

## 2024-08-07 NOTE — HOSPITAL COURSE
8/6>8/7: No  push buttons or seizures captured. EEG without epileptiform activity or electrographic seizures. Plan to sleep deprive and discontinue carbamazepine.   8/7>8/8: No events overnight. EEG without epileptiform activity or seizures. Patient completed sleep deprivation yesterday with provoking medication this morning.   8/8>8/9: No seizure or push buttons. EEG normal. Plan for sleep deprivation with seizure provoking medications tomorrow morning.   8/9>8/10: no new events  8/10>8/11: no new events  8/11>8/12: No typical events captured overnight, EEG normal since admission. Evaluated by Neuropsychology, recommend outpatient talk therapy/counseling. Given normal EEG during entirety of admission, discontinue Levetiracetam and Carbamazepine. Consider outpatient ambulatory EEG. Continue outpatient follow up with Dr. Amaya for further management.

## 2024-08-07 NOTE — PROGRESS NOTES
[facility] ([facility])  Neurology-Epilepsy  Progress Note    Patient Name: Abilio Lorenzana  MRN: [id number]  Admission Date: 8/6/2024  Hospital Length of Stay: 1 days  Code Status: Full Code   Attending Provider: Tyree Oneill MD  Primary Care Physician: Aaliyah, Primary Doctor   Principal Problem:Seizure-like activity    Subjective:     Hospital Course:   8/6>8/7: No  push buttons or seizures captured. EEG without epileptiform activity or electrographic seizures. Plan to sleep deprive and discontinue carbamazepine.     Interval History: No push buttons or seizures captured. EEG without epileptiform activity or electrographic seizures. Plan to sleep deprive and discontinue carbamazepine. Benadryl and tramadol in the morning.     Current Facility-Administered Medications   Medication Dose Route Frequency Provider Last Rate Last Admin    acetaminophen tablet 650 mg  650 mg Oral Q4H PRN Loy Strong MD        [START ON 8/8/2024] diphenhydrAMINE capsule 50 mg  50 mg Oral Once Loy Strong MD        docusate sodium capsule 100 mg  100 mg Oral BID PRN Loy Strong MD        LORazepam injection 2 mg  2 mg Intravenous PRN Loy Strong MD        nicotine 14 mg/24 hr 1 patch  1 patch Transdermal Daily Loy Strong MD   1 patch at 08/07/24 0834    ondansetron disintegrating tablet 8 mg  8 mg Oral Q8H PRN Loy Strong MD        sodium chloride 0.9% flush 10 mL  10 mL Intravenous PRN Loy Strong MD        [START ON 8/8/2024] traMADoL tablet 50 mg  50 mg Oral Once Loy Strong MD         Continuous Infusions:    Review of Systems   Constitutional:  Negative for chills and fever.   HENT:  Negative for rhinorrhea and sneezing.    Respiratory:  Negative for cough and shortness of breath.    Cardiovascular:  Negative for chest pain and leg swelling.   Gastrointestinal:  Negative for abdominal pain and nausea.   Genitourinary:  Negative for dysuria and hematuria.   Musculoskeletal:  Negative for  arthralgias and myalgias.   Neurological:  Positive for seizures. Negative for tremors and headaches.   Psychiatric/Behavioral:  Negative for agitation and confusion.      Objective:     Vital Signs (Most Recent):  Temp: 98.5 °F (36.9 °C) (08/07/24 1112)  Pulse: 72 (08/07/24 1112)  Resp: 18 (08/07/24 1112)  BP: (!) 94/51 (08/07/24 1112)  SpO2: 99 % (08/07/24 1112) Vital Signs (24h Range):  Temp:  [97.6 °F (36.4 °C)-98.5 °F (36.9 °C)] 98.5 °F (36.9 °C)  Pulse:  [38-72] 72  Resp:  [12-18] 18  SpO2:  [97 %-100 %] 99 %  BP: ()/(51-65) 94/51     Weight: 60.2 kg (132 lb 11.5 oz)  Body mass index is 19.6 kg/m².     Physical Exam  Vitals and nursing note reviewed.   Constitutional:       General: He is not in acute distress.  HENT:      Head: Normocephalic and atraumatic.      Nose: Nose normal. No rhinorrhea.   Eyes:      Extraocular Movements: Extraocular movements intact.      Conjunctiva/sclera: Conjunctivae normal.   Cardiovascular:      Rate and Rhythm: Normal rate and regular rhythm.   Pulmonary:      Effort: Pulmonary effort is normal. No respiratory distress.   Abdominal:      General: There is no distension.      Palpations: Abdomen is soft.   Musculoskeletal:      Right lower leg: No edema.      Left lower leg: No edema.   Skin:     General: Skin is warm and dry.      Capillary Refill: Capillary refill takes less than 2 seconds.   Neurological:      Mental Status: He is alert and oriented to person, place, and time.      Cranial Nerves: Cranial nerves 2-12 are intact.      Deep Tendon Reflexes:      Reflex Scores:       Patellar reflexes are 2+ on the right side and 2+ on the left side.           NEUROLOGICAL EXAMINATION:     MENTAL STATUS   Oriented to person, place, and time.     CRANIAL NERVES   Cranial nerves II through XII intact.     MOTOR EXAM   Overall muscle tone: normal    REFLEXES     Reflexes   Right patellar: 2+  Left patellar: 2+    SENSORY EXAM   Light touch normal.     GAIT AND COORDINATION  "    Tremor   Resting tremor: absent      Significant Labs: CBC:   Recent Labs   Lab 08/06/24  1542   WBC 4.94   HGB 12.9*   HCT 40.8        CMP:   Recent Labs   Lab 08/06/24  1542   GLU 79      K 3.9   *   CO2 25   BUN 8   CREATININE 0.8   CALCIUM 9.2   MG 2.1   PROT 6.0   ALBUMIN 3.8   BILITOT 0.5   ALKPHOS 51*   AST 15   ALT 12   ANIONGAP 6*       Significant Studies: EEG: I have reviewed all pertinent results/findings within the past 24 hours  Assessment and Plan:     * Seizure-like activity  Abilio Lorenzana is a 38 year old male with a history of smoking who presents per Dr. Amaya for capture of seizure events and optimization of medications. He experiences 1-2 episodes per month where he feels "light headed" and will proceed to pass out and convulse. Prior EEG 4/2024 was normal as well as MRI 1/2020. He is currently on keppra 750 mg BID and carbamazepine 300 mg BID.     Plan:  - Continuous EEG monitoring  - Discontinued keppra (8/6) and carbamazepine (8/6 down to 100 mg BID, 8/7 dc'd)  - Carbamazepine level< 1.9, pending keppra  - Sleep deprive until 3 AM 8/8; patient can sleep from 3 AM - 7 AM  - Give bendryl 50 mg and tramadol 50 mg 8/8 AM  - For seizure > 5 minute notify epilepsy on call  - Ativan 2 mg prn  - Oxygen and suction at bedside  - Continuous pulse oximetry   - Telemetry     Nicotine dependence  - Discussed smoking cessation and will start a nicotine patch while admitted.         VTE Risk Mitigation (From admission, onward)           Ordered     Place sequential compression device  Until discontinued         08/06/24 1329     IP VTE LOW RISK PATIENT  Once         08/06/24 1329                    Loy Strong MD  Neurology-Epilepsy  [facility] - Neurosurgery ([facility])  "

## 2024-08-07 NOTE — PLAN OF CARE
Problem: Adult Inpatient Plan of Care  Goal: Plan of Care Review  8/7/2024 0749 by Rohit Fairbanks RN  Outcome: Progressing  8/7/2024 0749 by Rohit Fairbanks RN  Outcome: Progressing  Goal: Patient-Specific Goal (Individualized)  8/7/2024 0749 by Rohit Fairbanks RN  Outcome: Progressing  8/7/2024 0749 by Rohit Fairbanks RN  Outcome: Progressing  Goal: Absence of Hospital-Acquired Illness or Injury  8/7/2024 0749 by Rohit Fairbanks RN  Outcome: Progressing  8/7/2024 0749 by Rohit Fairbanks RN  Outcome: Progressing  Goal: Optimal Comfort and Wellbeing  8/7/2024 0749 by Rohit Fairbanks RN  Outcome: Progressing  8/7/2024 0749 by Rohit Fairbanks, RN  Outcome: Progressing  Goal: Readiness for Transition of Care  8/7/2024 0749 by Rohit Fairbanks RN  Outcome: Progressing  8/7/2024 0749 by Rohit Fairbanks, RN  Outcome: Progressing

## 2024-08-07 NOTE — SUBJECTIVE & OBJECTIVE
Interval History: No push buttons or seizures captured. EEG without epileptiform activity or electrographic seizures. Plan to sleep deprive and discontinue carbamazepine. Benadryl and tramadol in the morning.     Current Facility-Administered Medications   Medication Dose Route Frequency Provider Last Rate Last Admin    acetaminophen tablet 650 mg  650 mg Oral Q4H PRN Loy Strong MD        [START ON 8/8/2024] diphenhydrAMINE capsule 50 mg  50 mg Oral Once Loy Strong MD        docusate sodium capsule 100 mg  100 mg Oral BID PRN Loy Strong MD        LORazepam injection 2 mg  2 mg Intravenous PRN Loy Strong MD        nicotine 14 mg/24 hr 1 patch  1 patch Transdermal Daily Loy Strong MD   1 patch at 08/07/24 0834    ondansetron disintegrating tablet 8 mg  8 mg Oral Q8H PRN Loy Strong MD        sodium chloride 0.9% flush 10 mL  10 mL Intravenous PRN Loy Strong MD        [START ON 8/8/2024] traMADoL tablet 50 mg  50 mg Oral Once Loy Strong MD         Continuous Infusions:    Review of Systems   Constitutional:  Negative for chills and fever.   HENT:  Negative for rhinorrhea and sneezing.    Respiratory:  Negative for cough and shortness of breath.    Cardiovascular:  Negative for chest pain and leg swelling.   Gastrointestinal:  Negative for abdominal pain and nausea.   Genitourinary:  Negative for dysuria and hematuria.   Musculoskeletal:  Negative for arthralgias and myalgias.   Neurological:  Positive for seizures. Negative for tremors and headaches.   Psychiatric/Behavioral:  Negative for agitation and confusion.      Objective:     Vital Signs (Most Recent):  Temp: 98.5 °F (36.9 °C) (08/07/24 1112)  Pulse: 72 (08/07/24 1112)  Resp: 18 (08/07/24 1112)  BP: (!) 94/51 (08/07/24 1112)  SpO2: 99 % (08/07/24 1112) Vital Signs (24h Range):  Temp:  [97.6 °F (36.4 °C)-98.5 °F (36.9 °C)] 98.5 °F (36.9 °C)  Pulse:  [38-72] 72  Resp:  [12-18] 18  SpO2:  [97 %-100 %] 99 %  BP:  ()/(51-65) 94/51     Weight: 60.2 kg (132 lb 11.5 oz)  Body mass index is 19.6 kg/m².     Physical Exam  Vitals and nursing note reviewed.   Constitutional:       General: He is not in acute distress.  HENT:      Head: Normocephalic and atraumatic.      Nose: Nose normal. No rhinorrhea.   Eyes:      Extraocular Movements: Extraocular movements intact.      Conjunctiva/sclera: Conjunctivae normal.   Cardiovascular:      Rate and Rhythm: Normal rate and regular rhythm.   Pulmonary:      Effort: Pulmonary effort is normal. No respiratory distress.   Abdominal:      General: There is no distension.      Palpations: Abdomen is soft.   Musculoskeletal:      Right lower leg: No edema.      Left lower leg: No edema.   Skin:     General: Skin is warm and dry.      Capillary Refill: Capillary refill takes less than 2 seconds.   Neurological:      Mental Status: He is alert and oriented to person, place, and time.      Cranial Nerves: Cranial nerves 2-12 are intact.      Deep Tendon Reflexes:      Reflex Scores:       Patellar reflexes are 2+ on the right side and 2+ on the left side.           NEUROLOGICAL EXAMINATION:     MENTAL STATUS   Oriented to person, place, and time.     CRANIAL NERVES   Cranial nerves II through XII intact.     MOTOR EXAM   Overall muscle tone: normal    REFLEXES     Reflexes   Right patellar: 2+  Left patellar: 2+    SENSORY EXAM   Light touch normal.     GAIT AND COORDINATION     Tremor   Resting tremor: absent      Significant Labs: CBC:   Recent Labs   Lab 08/06/24  1542   WBC 4.94   HGB 12.9*   HCT 40.8        CMP:   Recent Labs   Lab 08/06/24  1542   GLU 79      K 3.9   *   CO2 25   BUN 8   CREATININE 0.8   CALCIUM 9.2   MG 2.1   PROT 6.0   ALBUMIN 3.8   BILITOT 0.5   ALKPHOS 51*   AST 15   ALT 12   ANIONGAP 6*       Significant Studies: EEG: I have reviewed all pertinent results/findings within the past 24 hours

## 2024-08-07 NOTE — ASSESSMENT & PLAN NOTE
"Abilio Lorenzana is a 38 year old male with a history of smoking who presents per Dr. Amaya for capture of seizure events and optimization of medications. He experiences 1-2 episodes per month where he feels "light headed" and will proceed to pass out and convulse. Prior EEG 4/2024 was normal as well as MRI 1/2020. He is currently on keppra 750 mg BID and carbamazepine 300 mg BID.     Plan:  - Continuous EEG monitoring  - Discontinued keppra (8/6) and carbamazepine (8/6 down to 100 mg BID, 8/7 dc'd)  - Carbamazepine level< 1.9, pending keppra  - Sleep deprive until 3 AM 8/8; patient can sleep from 3 AM - 7 AM  - Give bendryl 50 mg and tramadol 50 mg 8/8 AM  - For seizure > 5 minute notify epilepsy on call  - Ativan 2 mg prn  - Oxygen and suction at bedside  - Continuous pulse oximetry   - Telemetry   "

## 2024-08-08 PROCEDURE — 95714 VEEG EA 12-26 HR UNMNTR: CPT

## 2024-08-08 PROCEDURE — 25000003 PHARM REV CODE 250

## 2024-08-08 PROCEDURE — 11000001 HC ACUTE MED/SURG PRIVATE ROOM

## 2024-08-08 PROCEDURE — A4216 STERILE WATER/SALINE, 10 ML: HCPCS

## 2024-08-08 PROCEDURE — S4991 NICOTINE PATCH NONLEGEND: HCPCS

## 2024-08-08 PROCEDURE — 95720 EEG PHY/QHP EA INCR W/VEEG: CPT | Mod: ,,, | Performed by: PSYCHIATRY & NEUROLOGY

## 2024-08-08 PROCEDURE — 94761 N-INVAS EAR/PLS OXIMETRY MLT: CPT

## 2024-08-08 RX ADMIN — DIPHENHYDRAMINE HYDROCHLORIDE 50 MG: 50 CAPSULE ORAL at 06:08

## 2024-08-08 RX ADMIN — Medication 10 ML: at 08:08

## 2024-08-08 RX ADMIN — TRAMADOL HYDROCHLORIDE 50 MG: 50 TABLET, COATED ORAL at 06:08

## 2024-08-08 RX ADMIN — NICOTINE 1 PATCH: 14 PATCH, EXTENDED RELEASE TRANSDERMAL at 09:08

## 2024-08-08 NOTE — SUBJECTIVE & OBJECTIVE
Interval History: No events overnight. EEG without epileptiform activity or seizures. Patient completed sleep deprivation yesterday with provoking medication this morning.     Current Facility-Administered Medications   Medication Dose Route Frequency Provider Last Rate Last Admin    acetaminophen tablet 650 mg  650 mg Oral Q4H PRN Loy Strong MD        docusate sodium capsule 100 mg  100 mg Oral BID PRN Loy Strong MD        LORazepam injection 2 mg  2 mg Intravenous PRN Loy Strong MD        nicotine 14 mg/24 hr 1 patch  1 patch Transdermal Daily Loy Strong MD   1 patch at 08/08/24 0930    ondansetron disintegrating tablet 8 mg  8 mg Oral Q8H PRN Loy Strong MD        sodium chloride 0.9% flush 10 mL  10 mL Intravenous PRN Loy Strong MD   10 mL at 08/07/24 2035     Continuous Infusions:    Review of Systems   Constitutional:  Negative for chills and fever.   HENT:  Negative for rhinorrhea and sneezing.    Respiratory:  Negative for cough and shortness of breath.    Cardiovascular:  Negative for chest pain and leg swelling.   Gastrointestinal:  Negative for abdominal pain and nausea.   Genitourinary:  Negative for dysuria and hematuria.   Musculoskeletal:  Negative for arthralgias and myalgias.   Neurological:  Positive for seizures. Negative for tremors and headaches.   Psychiatric/Behavioral:  Negative for agitation and confusion.      Objective:     Vital Signs (Most Recent):  Temp: 98.5 °F (36.9 °C) (08/08/24 0727)  Pulse: (!) 53 (08/08/24 1106)  Resp: 20 (08/08/24 0727)  BP: 101/63 (08/08/24 0727)  SpO2: 98 % (08/08/24 1106) Vital Signs (24h Range):  Temp:  [97.9 °F (36.6 °C)-98.6 °F (37 °C)] 98.5 °F (36.9 °C)  Pulse:  [49-77] 53  Resp:  [15-20] 20  SpO2:  [95 %-99 %] 98 %  BP: (101-117)/(57-74) 101/63     Weight: 60.2 kg (132 lb 11.5 oz)  Body mass index is 19.6 kg/m².     Physical Exam  Vitals and nursing note reviewed.   Constitutional:       General: He is not in acute  distress.  HENT:      Head: Normocephalic and atraumatic.      Nose: Nose normal. No rhinorrhea.   Eyes:      Extraocular Movements: Extraocular movements intact.      Conjunctiva/sclera: Conjunctivae normal.   Cardiovascular:      Rate and Rhythm: Normal rate and regular rhythm.   Pulmonary:      Effort: Pulmonary effort is normal. No respiratory distress.   Abdominal:      General: There is no distension.      Palpations: Abdomen is soft.   Musculoskeletal:      Right lower leg: No edema.      Left lower leg: No edema.   Skin:     General: Skin is warm and dry.      Capillary Refill: Capillary refill takes less than 2 seconds.   Neurological:      Mental Status: He is alert and oriented to person, place, and time.      Cranial Nerves: Cranial nerves 2-12 are intact.      Deep Tendon Reflexes:      Reflex Scores:       Patellar reflexes are 2+ on the right side and 2+ on the left side.           NEUROLOGICAL EXAMINATION:     MENTAL STATUS   Oriented to person, place, and time.     CRANIAL NERVES   Cranial nerves II through XII intact.     MOTOR EXAM   Overall muscle tone: normal    REFLEXES     Reflexes   Right patellar: 2+  Left patellar: 2+    SENSORY EXAM   Light touch normal.     GAIT AND COORDINATION     Tremor   Resting tremor: absent      Significant Labs: CBC:   Recent Labs   Lab 08/06/24  1542   WBC 4.94   HGB 12.9*   HCT 40.8        CMP:   Recent Labs   Lab 08/06/24  1542   GLU 79      K 3.9   *   CO2 25   BUN 8   CREATININE 0.8   CALCIUM 9.2   MG 2.1   PROT 6.0   ALBUMIN 3.8   BILITOT 0.5   ALKPHOS 51*   AST 15   ALT 12   ANIONGAP 6*       Significant Studies: EEG: I have reviewed all pertinent results/findings within the past 24 hours

## 2024-08-08 NOTE — PLAN OF CARE
Problem: Adult Inpatient Plan of Care  Goal: Plan of Care Review  Outcome: Progressing  Goal: Patient-Specific Goal (Individualized)  Outcome: Progressing  Goal: Absence of Hospital-Acquired Illness or Injury  Outcome: Progressing  Goal: Optimal Comfort and Wellbeing  Outcome: Progressing  Goal: Readiness for Transition of Care  Outcome: Progressing     Problem: Seizure, Active Management  Goal: Absence of Seizure/Seizure-Related Injury  Outcome: Progressing     Problem: Fall Injury Risk  Goal: Absence of Fall and Fall-Related Injury  Outcome: Progressing           POC reviewed and updated with the patient/mother. Questions regarding POC were encouraged and addressed with the patient.  VSS, see flow-sheets. Tele maintained per order.  Patient is AO X 4 at this time. Continuous EEG in place. Fall/safety precautions maintained, no signs of injury noted during shift. Seizure precautions maintained. Upon exiting room, patient's bed locked in low position, side rails up x 4, bed alarm refused, with call light within reach. Instructed patient to call staff for assistance, verbalized understanding.  No acute signs of distress noted at this time.

## 2024-08-08 NOTE — PROGRESS NOTES
[facility] - Neurosurgery ([facility])  Neurology-Epilepsy  Progress Note    Patient Name: Abilio Lorenzana  MRN: [id number]  Admission Date: 8/6/2024  Hospital Length of Stay: 2 days  Code Status: Full Code   Attending Provider: Tyree Oneill MD  Primary Care Physician: No, Primary Doctor   Principal Problem:Seizure-like activity    Subjective:     Hospital Course:   8/6>8/7: No  push buttons or seizures captured. EEG without epileptiform activity or electrographic seizures. Plan to sleep deprive and discontinue carbamazepine.   8/7>8/8: No events overnight. EEG without epileptiform activity or seizures. Patient completed sleep deprivation yesterday with provoking medication this morning.     Interval History: No events overnight. EEG without epileptiform activity or seizures. Patient completed sleep deprivation yesterday with provoking medication this morning.     Current Facility-Administered Medications   Medication Dose Route Frequency Provider Last Rate Last Admin    acetaminophen tablet 650 mg  650 mg Oral Q4H PRN Loy Strong MD        docusate sodium capsule 100 mg  100 mg Oral BID PRN Loy Strong MD        LORazepam injection 2 mg  2 mg Intravenous PRN Loy Strong MD        nicotine 14 mg/24 hr 1 patch  1 patch Transdermal Daily Loy Strong MD   1 patch at 08/08/24 0930    ondansetron disintegrating tablet 8 mg  8 mg Oral Q8H PRN Loy Strong MD        sodium chloride 0.9% flush 10 mL  10 mL Intravenous PRN Loy Strong MD   10 mL at 08/07/24 2035     Continuous Infusions:    Review of Systems   Constitutional:  Negative for chills and fever.   HENT:  Negative for rhinorrhea and sneezing.    Respiratory:  Negative for cough and shortness of breath.    Cardiovascular:  Negative for chest pain and leg swelling.   Gastrointestinal:  Negative for abdominal pain and nausea.   Genitourinary:  Negative for dysuria and hematuria.   Musculoskeletal:  Negative for arthralgias and myalgias.    Neurological:  Positive for seizures. Negative for tremors and headaches.   Psychiatric/Behavioral:  Negative for agitation and confusion.      Objective:     Vital Signs (Most Recent):  Temp: 98.5 °F (36.9 °C) (08/08/24 0727)  Pulse: (!) 53 (08/08/24 1106)  Resp: 20 (08/08/24 0727)  BP: 101/63 (08/08/24 0727)  SpO2: 98 % (08/08/24 1106) Vital Signs (24h Range):  Temp:  [97.9 °F (36.6 °C)-98.6 °F (37 °C)] 98.5 °F (36.9 °C)  Pulse:  [49-77] 53  Resp:  [15-20] 20  SpO2:  [95 %-99 %] 98 %  BP: (101-117)/(57-74) 101/63     Weight: 60.2 kg (132 lb 11.5 oz)  Body mass index is 19.6 kg/m².     Physical Exam  Vitals and nursing note reviewed.   Constitutional:       General: He is not in acute distress.  HENT:      Head: Normocephalic and atraumatic.      Nose: Nose normal. No rhinorrhea.   Eyes:      Extraocular Movements: Extraocular movements intact.      Conjunctiva/sclera: Conjunctivae normal.   Cardiovascular:      Rate and Rhythm: Normal rate and regular rhythm.   Pulmonary:      Effort: Pulmonary effort is normal. No respiratory distress.   Abdominal:      General: There is no distension.      Palpations: Abdomen is soft.   Musculoskeletal:      Right lower leg: No edema.      Left lower leg: No edema.   Skin:     General: Skin is warm and dry.      Capillary Refill: Capillary refill takes less than 2 seconds.   Neurological:      Mental Status: He is alert and oriented to person, place, and time.      Cranial Nerves: Cranial nerves 2-12 are intact.      Deep Tendon Reflexes:      Reflex Scores:       Patellar reflexes are 2+ on the right side and 2+ on the left side.           NEUROLOGICAL EXAMINATION:     MENTAL STATUS   Oriented to person, place, and time.     CRANIAL NERVES   Cranial nerves II through XII intact.     MOTOR EXAM   Overall muscle tone: normal    REFLEXES     Reflexes   Right patellar: 2+  Left patellar: 2+    SENSORY EXAM   Light touch normal.     GAIT AND COORDINATION     Tremor   Resting  "tremor: absent      Significant Labs: CBC:   Recent Labs   Lab 08/06/24  1542   WBC 4.94   HGB 12.9*   HCT 40.8        CMP:   Recent Labs   Lab 08/06/24  1542   GLU 79      K 3.9   *   CO2 25   BUN 8   CREATININE 0.8   CALCIUM 9.2   MG 2.1   PROT 6.0   ALBUMIN 3.8   BILITOT 0.5   ALKPHOS 51*   AST 15   ALT 12   ANIONGAP 6*       Significant Studies: EEG: I have reviewed all pertinent results/findings within the past 24 hours  Assessment and Plan:     * Seizure-like activity  Abilio Lorenzana is a 38 year old male with a history of smoking who presents per Dr. Amaya for capture of seizure events and optimization of medications. He experiences 1-2 episodes per month where he feels "light headed" and will proceed to pass out and convulse. Prior EEG 4/2024 was normal as well as MRI 1/2020. He is currently on keppra 750 mg BID and carbamazepine 300 mg BID.     Plan:  - Continuous EEG monitoring  - Discontinued keppra (8/6) and carbamazepine (8/6 down to 100 mg BID, 8/7 dc'd)  - Carbamazepine level< 1.9, pending keppra  - Pending capture of typical event   - For seizure > 5 minute notify epilepsy on call  - Ativan 2 mg prn  - Oxygen and suction at bedside  - Continuous pulse oximetry   - Telemetry     Nicotine dependence  - Discussed smoking cessation and will start a nicotine patch while admitted.         VTE Risk Mitigation (From admission, onward)           Ordered     Place sequential compression device  Until discontinued         08/06/24 1329     IP VTE LOW RISK PATIENT  Once         08/06/24 1329                    Loy Strong MD  Neurology-Epilepsy  [facility] - Neurosurgery ([facility])  "

## 2024-08-08 NOTE — PROCEDURES
DATE: 8/7/24    EEG NUMBER:  [id number]    REFERRING PHYSICIAN:  Dr. Amaya     This EEG was performed to assess for evidence of underlying epilepsy.     ELECTROENCEPHALOGRAM REPORT     METHODOLOGY:  Electroencephalographic (EEG) recording is with electrodes placed according to the International 10-20 placement system.  Thirty two (32) channels of digital signal are simultaneously recorded from the scalp and may include EKG, EMG, and/or eye monitors.   Recording band pass was 0.1 to 512 hz.  Digital video recording of the patient is simultaneously recorded with the EEG.  The staff report clinical symptoms and may press an event button when the patient has symptoms of clinical interest to the treating physicians.  EEG and video recording is stored and archived in digital format.  The entire recording is visually reviewed, and the times identified by computer analysis as being spikes or seizures are reviewed again.  Activation procedures which include photic stimulation, hyperventilation and instructing patients to perform simple task are done in selected patients.   Compresses spectral analysis (CSA) is also performed on the activity recorded from each individual channel.  This is displayed as a power display of frequencies from 0 to 30 Hz over time.   The CSA analysis is done and displayed continuously.  This is reviewed for asymmetries in power between homologous areas of the scalp and for presence of changes in power which can be seen when seizures occur.  Sections of suspected abnormalities on the CSA is then compared with the original EEG recording.                [employer] software was also utilized in the review of this study.  This software suite analyzes the EEG recording in multiple domains.  Coherence and rhythmicity is computed to identify EEG sections which may contain organized seizures.  Each channel undergoes analysis to detect presence of spike and sharp waves which have special and  morphological characteristic of epileptic activity.  The routine EEG recording is converted from spacial into frequency domain.  This is then displayed comparing homologous areas to identify areas of significant asymmetry.  Algorithm to identify non-cortically generated artifact is used to separate eye movement, EMG and other artifact from the EEG.     Recording times   Start August 7, 2024 at hour 7 minute 0 seconds 56  End on August 8, 2024 at hours 7 minute 0 seconds 1   The total time of EEG recording for the study was 23 hours and 41 minutes    EEG FINDINGS:  The recording was obtained with a number of standard bipolar and referential montages during wakefulness, drowsiness and sleep.  In the alert state, the posterior background rhythm was a symmetric, well-modulated 9 to 10 Hz alpha rhythm, which reacted symmetrically to eye opening.  Intermittent photic stimulation evoked symmetric posterior driving responses.  Hyperventilation produced physiological slowing.  No abnormalities were activated by photic stimulation or hyperventilation.  During drowsiness, the background rhythm waxed and waned and there were periods of slowing.  During stage II sleep, symmetric V waves and sleep spindles were noted.  There were no focal abnormalities.  There were no interictal epileptiform abnormalities and no clinical or electrographic seizures were recorded.    The EKG channel revealed a sinus rhythm.     IMPRESSION:  This is a normal EEG during wakefulness, drowsiness and sleep.     CLINICAL CORRELATION:  The patient is a  38-year-old male who is being evaluated for episodes of loss of consciousness.  The patient is currently not maintained on any antiseizure medications. This is a normal EEG during wakefulness, drowsiness and sleep.  There is no evidence for neither cortical dysfunction nor an epileptic process on this recording.  No seizures were recorded during this study.

## 2024-08-08 NOTE — ASSESSMENT & PLAN NOTE
"Abilio Lorenzana is a 38 year old male with a history of smoking who presents per Dr. Amaya for capture of seizure events and optimization of medications. He experiences 1-2 episodes per month where he feels "light headed" and will proceed to pass out and convulse. Prior EEG 4/2024 was normal as well as MRI 1/2020. He is currently on keppra 750 mg BID and carbamazepine 300 mg BID.     Plan:  - Continuous EEG monitoring  - Discontinued keppra (8/6) and carbamazepine (8/6 down to 100 mg BID, 8/7 dc'd)  - Carbamazepine level< 1.9, pending keppra  - Pending capture of typical event   - For seizure > 5 minute notify epilepsy on call  - Ativan 2 mg prn  - Oxygen and suction at bedside  - Continuous pulse oximetry   - Telemetry   "

## 2024-08-09 ENCOUNTER — DOCUMENTATION ONLY (OUTPATIENT)
Dept: NEUROLOGY | Facility: CLINIC | Age: 39
End: 2024-08-09
Payer: COMMERCIAL

## 2024-08-09 LAB — LEVETIRACETAM SERPL-MCNC: <1 UG/ML (ref 3–60)

## 2024-08-09 PROCEDURE — 94761 N-INVAS EAR/PLS OXIMETRY MLT: CPT

## 2024-08-09 PROCEDURE — 25000003 PHARM REV CODE 250

## 2024-08-09 PROCEDURE — 95714 VEEG EA 12-26 HR UNMNTR: CPT

## 2024-08-09 PROCEDURE — 95720 EEG PHY/QHP EA INCR W/VEEG: CPT | Mod: ,,, | Performed by: PSYCHIATRY & NEUROLOGY

## 2024-08-09 PROCEDURE — S4991 NICOTINE PATCH NONLEGEND: HCPCS

## 2024-08-09 PROCEDURE — 11000001 HC ACUTE MED/SURG PRIVATE ROOM

## 2024-08-09 PROCEDURE — A4216 STERILE WATER/SALINE, 10 ML: HCPCS

## 2024-08-09 RX ORDER — DIPHENHYDRAMINE HCL 50 MG
50 CAPSULE ORAL ONCE
Status: COMPLETED | OUTPATIENT
Start: 2024-08-10 | End: 2024-08-10

## 2024-08-09 RX ORDER — TRAMADOL HYDROCHLORIDE 50 MG/1
50 TABLET ORAL ONCE
Status: COMPLETED | OUTPATIENT
Start: 2024-08-10 | End: 2024-08-10

## 2024-08-09 RX ADMIN — Medication 10 ML: at 08:08

## 2024-08-09 RX ADMIN — NICOTINE 1 PATCH: 14 PATCH, EXTENDED RELEASE TRANSDERMAL at 09:08

## 2024-08-09 RX ADMIN — ACETAMINOPHEN 650 MG: 325 TABLET ORAL at 06:08

## 2024-08-09 NOTE — SUBJECTIVE & OBJECTIVE
Interval History: No seizure or push buttons. EEG normal. Plan for sleep deprivation with seizure provoking medications tomorrow morning.      Current Facility-Administered Medications   Medication Dose Route Frequency Provider Last Rate Last Admin    acetaminophen tablet 650 mg  650 mg Oral Q4H PRN Loy Strong MD        [START ON 8/10/2024] diphenhydrAMINE capsule 50 mg  50 mg Oral Once Loy Strong MD        docusate sodium capsule 100 mg  100 mg Oral BID PRN Loy Strong MD        LORazepam injection 2 mg  2 mg Intravenous PRN Loy Strong MD        nicotine 14 mg/24 hr 1 patch  1 patch Transdermal Daily Loy Strong MD   1 patch at 08/09/24 0900    ondansetron disintegrating tablet 8 mg  8 mg Oral Q8H PRN Loy Strong MD        sodium chloride 0.9% flush 10 mL  10 mL Intravenous PRN Loy Strong MD   10 mL at 08/08/24 2039    [START ON 8/10/2024] traMADoL tablet 50 mg  50 mg Oral Once Loy Strong MD         Continuous Infusions:    Review of Systems   Constitutional:  Negative for chills and fever.   HENT:  Negative for rhinorrhea and sneezing.    Respiratory:  Negative for cough and shortness of breath.    Cardiovascular:  Negative for chest pain and leg swelling.   Gastrointestinal:  Negative for abdominal pain and nausea.   Genitourinary:  Negative for dysuria and hematuria.   Musculoskeletal:  Negative for arthralgias and myalgias.   Neurological:  Positive for seizures. Negative for tremors and headaches.   Psychiatric/Behavioral:  Negative for agitation and confusion.      Objective:     Vital Signs (Most Recent):  Temp: 97.7 °F (36.5 °C) (08/09/24 0738)  Pulse: 60 (08/09/24 1128)  Resp: 18 (08/09/24 0738)  BP: (!) 90/57 (08/09/24 0738)  SpO2: 98 % (08/09/24 1128) Vital Signs (24h Range):  Temp:  [97.7 °F (36.5 °C)-98.7 °F (37.1 °C)] 97.7 °F (36.5 °C)  Pulse:  [54-82] 60  Resp:  [17-18] 18  SpO2:  [95 %-98 %] 98 %  BP: ()/(57-73) 90/57     Weight: 60.2 kg (132 lb 11.5  "oz)  Body mass index is 19.6 kg/m².     Physical Exam  Vitals and nursing note reviewed.   Constitutional:       General: He is not in acute distress.  HENT:      Head: Normocephalic and atraumatic.      Nose: Nose normal. No rhinorrhea.   Eyes:      Extraocular Movements: Extraocular movements intact.      Conjunctiva/sclera: Conjunctivae normal.   Cardiovascular:      Rate and Rhythm: Normal rate and regular rhythm.   Pulmonary:      Effort: Pulmonary effort is normal. No respiratory distress.   Abdominal:      General: There is no distension.      Palpations: Abdomen is soft.   Musculoskeletal:      Right lower leg: No edema.      Left lower leg: No edema.   Skin:     General: Skin is warm and dry.      Capillary Refill: Capillary refill takes less than 2 seconds.   Neurological:      Mental Status: He is alert and oriented to person, place, and time.      Cranial Nerves: Cranial nerves 2-12 are intact.      Deep Tendon Reflexes:      Reflex Scores:       Patellar reflexes are 2+ on the right side and 2+ on the left side.           NEUROLOGICAL EXAMINATION:     MENTAL STATUS   Oriented to person, place, and time.     CRANIAL NERVES   Cranial nerves II through XII intact.     MOTOR EXAM   Overall muscle tone: normal    REFLEXES     Reflexes   Right patellar: 2+  Left patellar: 2+    SENSORY EXAM   Light touch normal.     GAIT AND COORDINATION     Tremor   Resting tremor: absent      Significant Labs: CBC:   No results for input(s): "WBC", "HGB", "HCT", "PLT" in the last 48 hours.    CMP:   No results for input(s): "GLU", "NA", "K", "CL", "CO2", "BUN", "CREATININE", "CALCIUM", "MG", "PROT", "ALBUMIN", "BILITOT", "ALKPHOS", "AST", "ALT", "ANIONGAP", "EGFRNONAA" in the last 48 hours.      Significant Studies: EEG: I have reviewed all pertinent results/findings within the past 24 hours  "

## 2024-08-09 NOTE — PROCEDURES
DATE: 8/8/24    EEG NUMBER:  EMU -[id number]    REFERRING PHYSICIAN:  Dr. Amaya     This EEG was performed to assess for evidence of underlying epilepsy.     ELECTROENCEPHALOGRAM REPORT     METHODOLOGY:  Electroencephalographic (EEG) recording is with electrodes placed according to the International 10-20 placement system.  Thirty two (32) channels of digital signal are simultaneously recorded from the scalp and may include EKG, EMG, and/or eye monitors.   Recording band pass was 0.1 to 512 hz.  Digital video recording of the patient is simultaneously recorded with the EEG.  The staff report clinical symptoms and may press an event button when the patient has symptoms of clinical interest to the treating physicians.  EEG and video recording is stored and archived in digital format.  The entire recording is visually reviewed, and the times identified by computer analysis as being spikes or seizures are reviewed again.  Activation procedures which include photic stimulation, hyperventilation and instructing patients to perform simple task are done in selected patients.   Compresses spectral analysis (CSA) is also performed on the activity recorded from each individual channel.  This is displayed as a power display of frequencies from 0 to 30 Hz over time.   The CSA analysis is done and displayed continuously.  This is reviewed for asymmetries in power between homologous areas of the scalp and for presence of changes in power which can be seen when seizures occur.  Sections of suspected abnormalities on the CSA is then compared with the original EEG recording.                [employer] software was also utilized in the review of this study.  This software suite analyzes the EEG recording in multiple domains.  Coherence and rhythmicity is computed to identify EEG sections which may contain organized seizures.  Each channel undergoes analysis to detect presence of spike and sharp waves which have special and  morphological characteristic of epileptic activity.  The routine EEG recording is converted from spacial into frequency domain.  This is then displayed comparing homologous areas to identify areas of significant asymmetry.  Algorithm to identify non-cortically generated artifact is used to separate eye movement, EMG and other artifact from the EEG.     Recording times   Start August 8, 2024 at hour 7 minute 0 seconds 56  End on August 9, 2024 at hours 7 minute 0 seconds 1   The total time of EEG recording for the study was 23 hours and 44 minutes    EEG FINDINGS:  The recording was obtained with a number of standard bipolar and referential montages during wakefulness, drowsiness and sleep.  In the alert state, the posterior background rhythm was a symmetric, well-modulated 9 to 10 Hz alpha rhythm, which reacted symmetrically to eye opening.  Intermittent photic stimulation evoked symmetric posterior driving responses.  Hyperventilation produced physiological slowing.  No abnormalities were activated by photic stimulation or hyperventilation.  During drowsiness, the background rhythm waxed and waned and there were periods of slowing.  During stage II sleep, symmetric V waves and sleep spindles were noted.  There were no focal abnormalities.  There were no interictal epileptiform abnormalities and no clinical or electrographic seizures were recorded.    The EKG channel revealed a sinus rhythm.     IMPRESSION:  This is a normal EEG during wakefulness, drowsiness and sleep.     CLINICAL CORRELATION:  The patient is a  38-year-old male who is being evaluated for episodes of loss of consciousness.  The patient is currently not maintained on any antiseizure medications. This is a normal EEG during wakefulness, drowsiness and sleep.  There is no evidence for neither cortical dysfunction nor an epileptic process on this recording.  No seizures were recorded during this study.

## 2024-08-09 NOTE — ASSESSMENT & PLAN NOTE
"Abilio Lorenzana is a 38 year old male with a history of smoking who presents per Dr. Amaya for capture of seizure events and optimization of medications. He experiences 1-2 episodes per month where he feels "light headed" and will proceed to pass out and convulse. Prior EEG 4/2024 was normal as well as MRI 1/2020. He is currently on keppra 750 mg BID and carbamazepine 300 mg BID.     Plan:  - Continuous EEG monitoring  - Discontinued keppra (8/6) and carbamazepine (8/6 down to 100 mg BID, 8/7 dc'd)  - Carbamazepine level< 1.9, Keppra<1.0  - Sleep deprivation tonight with benadryl/tramadol tomorrow morning  - Patient has not experienced an event yet though suspect non epileptic spells; will discuss with neuropsychology for possible consult on Monday   - For seizure > 5 minute notify epilepsy on call  - Ativan 2 mg prn  - Oxygen and suction at bedside  - Continuous pulse oximetry   - Telemetry   "

## 2024-08-09 NOTE — PROGRESS NOTES
[facility] ([facility])  Neurology-Epilepsy  Progress Note    Patient Name: Abilio Lorenzana  MRN: [id number]  Admission Date: 8/6/2024  Hospital Length of Stay: 3 days  Code Status: Full Code   Attending Provider: Tyree Oneill MD  Primary Care Physician: Aaliyah, Primary Doctor   Principal Problem:Seizure-like activity    Subjective:     Hospital Course:   8/6>8/7: No  push buttons or seizures captured. EEG without epileptiform activity or electrographic seizures. Plan to sleep deprive and discontinue carbamazepine.   8/7>8/8: No events overnight. EEG without epileptiform activity or seizures. Patient completed sleep deprivation yesterday with provoking medication this morning.   8/8>8/9: No seizure or push buttons. EEG normal. Plan for sleep deprivation with seizure provoking medications tomorrow morning.     Interval History: No seizure or push buttons. EEG normal. Plan for sleep deprivation with seizure provoking medications tomorrow morning.      Current Facility-Administered Medications   Medication Dose Route Frequency Provider Last Rate Last Admin    acetaminophen tablet 650 mg  650 mg Oral Q4H PRN Loy Strong MD        [START ON 8/10/2024] diphenhydrAMINE capsule 50 mg  50 mg Oral Once Loy Strong MD        docusate sodium capsule 100 mg  100 mg Oral BID PRN Loy Strong MD        LORazepam injection 2 mg  2 mg Intravenous PRN Loy Strong MD        nicotine 14 mg/24 hr 1 patch  1 patch Transdermal Daily Loy Strong MD   1 patch at 08/09/24 0900    ondansetron disintegrating tablet 8 mg  8 mg Oral Q8H PRN Loy Strong MD        sodium chloride 0.9% flush 10 mL  10 mL Intravenous PRN Loy Strong MD   10 mL at 08/08/24 2039    [START ON 8/10/2024] traMADoL tablet 50 mg  50 mg Oral Once Loy Strong MD         Continuous Infusions:    Review of Systems   Constitutional:  Negative for chills and fever.   HENT:  Negative for rhinorrhea and sneezing.    Respiratory:  Negative for  cough and shortness of breath.    Cardiovascular:  Negative for chest pain and leg swelling.   Gastrointestinal:  Negative for abdominal pain and nausea.   Genitourinary:  Negative for dysuria and hematuria.   Musculoskeletal:  Negative for arthralgias and myalgias.   Neurological:  Positive for seizures. Negative for tremors and headaches.   Psychiatric/Behavioral:  Negative for agitation and confusion.      Objective:     Vital Signs (Most Recent):  Temp: 97.7 °F (36.5 °C) (08/09/24 0738)  Pulse: 60 (08/09/24 1128)  Resp: 18 (08/09/24 0738)  BP: (!) 90/57 (08/09/24 0738)  SpO2: 98 % (08/09/24 1128) Vital Signs (24h Range):  Temp:  [97.7 °F (36.5 °C)-98.7 °F (37.1 °C)] 97.7 °F (36.5 °C)  Pulse:  [54-82] 60  Resp:  [17-18] 18  SpO2:  [95 %-98 %] 98 %  BP: ()/(57-73) 90/57     Weight: 60.2 kg (132 lb 11.5 oz)  Body mass index is 19.6 kg/m².     Physical Exam  Vitals and nursing note reviewed.   Constitutional:       General: He is not in acute distress.  HENT:      Head: Normocephalic and atraumatic.      Nose: Nose normal. No rhinorrhea.   Eyes:      Extraocular Movements: Extraocular movements intact.      Conjunctiva/sclera: Conjunctivae normal.   Cardiovascular:      Rate and Rhythm: Normal rate and regular rhythm.   Pulmonary:      Effort: Pulmonary effort is normal. No respiratory distress.   Abdominal:      General: There is no distension.      Palpations: Abdomen is soft.   Musculoskeletal:      Right lower leg: No edema.      Left lower leg: No edema.   Skin:     General: Skin is warm and dry.      Capillary Refill: Capillary refill takes less than 2 seconds.   Neurological:      Mental Status: He is alert and oriented to person, place, and time.      Cranial Nerves: Cranial nerves 2-12 are intact.      Deep Tendon Reflexes:      Reflex Scores:       Patellar reflexes are 2+ on the right side and 2+ on the left side.           NEUROLOGICAL EXAMINATION:     MENTAL STATUS   Oriented to person, place, and  "time.     CRANIAL NERVES   Cranial nerves II through XII intact.     MOTOR EXAM   Overall muscle tone: normal    REFLEXES     Reflexes   Right patellar: 2+  Left patellar: 2+    SENSORY EXAM   Light touch normal.     GAIT AND COORDINATION     Tremor   Resting tremor: absent      Significant Labs: CBC:   No results for input(s): "WBC", "HGB", "HCT", "PLT" in the last 48 hours.    CMP:   No results for input(s): "GLU", "NA", "K", "CL", "CO2", "BUN", "CREATININE", "CALCIUM", "MG", "PROT", "ALBUMIN", "BILITOT", "ALKPHOS", "AST", "ALT", "ANIONGAP", "EGFRNONAA" in the last 48 hours.      Significant Studies: EEG: I have reviewed all pertinent results/findings within the past 24 hours  Assessment and Plan:     * Seizure-like activity  Abilio Lorenzana is a 38 year old male with a history of smoking who presents per Dr. Amaya for capture of seizure events and optimization of medications. He experiences 1-2 episodes per month where he feels "light headed" and will proceed to pass out and convulse. Prior EEG 4/2024 was normal as well as MRI 1/2020. He is currently on keppra 750 mg BID and carbamazepine 300 mg BID.     Plan:  - Continuous EEG monitoring  - Discontinued keppra (8/6) and carbamazepine (8/6 down to 100 mg BID, 8/7 dc'd)  - Carbamazepine level< 1.9, Keppra<1.0  - Sleep deprivation tonight with benadryl/tramadol tomorrow morning  - Patient has not experienced an event yet though suspect non epileptic spells; will discuss with neuropsychology for possible consult on Monday   - For seizure > 5 minute notify epilepsy on call  - Ativan 2 mg prn  - Oxygen and suction at bedside  - Continuous pulse oximetry   - Telemetry     Nicotine dependence  - Discussed smoking cessation and will start a nicotine patch while admitted.         VTE Risk Mitigation (From admission, onward)           Ordered     Place sequential compression device  Until discontinued         08/06/24 1329     IP VTE LOW RISK PATIENT  Once         " 08/06/24 1329                    Loy Strong MD  Neurology-Epilepsy  [facility] ([facility])

## 2024-08-09 NOTE — PLAN OF CARE
Problem: Adult Inpatient Plan of Care  Goal: Plan of Care Review  Outcome: Progressing  Goal: Patient-Specific Goal (Individualized)  Outcome: Progressing  Goal: Absence of Hospital-Acquired Illness or Injury  Outcome: Progressing  Goal: Optimal Comfort and Wellbeing  Outcome: Progressing  Goal: Readiness for Transition of Care  Outcome: Progressing     Problem: Seizure, Active Management  Goal: Absence of Seizure/Seizure-Related Injury  Outcome: Progressing     Problem: Fall Injury Risk  Goal: Absence of Fall and Fall-Related Injury  Outcome: Progressing

## 2024-08-10 PROCEDURE — 25000003 PHARM REV CODE 250

## 2024-08-10 PROCEDURE — S4991 NICOTINE PATCH NONLEGEND: HCPCS

## 2024-08-10 PROCEDURE — 95720 EEG PHY/QHP EA INCR W/VEEG: CPT | Mod: ,,, | Performed by: PSYCHIATRY & NEUROLOGY

## 2024-08-10 PROCEDURE — 99233 SBSQ HOSP IP/OBS HIGH 50: CPT | Mod: ,,, | Performed by: PSYCHIATRY & NEUROLOGY

## 2024-08-10 PROCEDURE — 11000001 HC ACUTE MED/SURG PRIVATE ROOM

## 2024-08-10 PROCEDURE — 95714 VEEG EA 12-26 HR UNMNTR: CPT

## 2024-08-10 PROCEDURE — 94761 N-INVAS EAR/PLS OXIMETRY MLT: CPT

## 2024-08-10 RX ADMIN — NICOTINE 1 PATCH: 14 PATCH, EXTENDED RELEASE TRANSDERMAL at 09:08

## 2024-08-10 RX ADMIN — DIPHENHYDRAMINE HYDROCHLORIDE 50 MG: 50 CAPSULE ORAL at 06:08

## 2024-08-10 RX ADMIN — TRAMADOL HYDROCHLORIDE 50 MG: 50 TABLET, COATED ORAL at 06:08

## 2024-08-10 NOTE — PLAN OF CARE
Problem: Adult Inpatient Plan of Care  Goal: Plan of Care Review  Outcome: Progressing  Goal: Patient-Specific Goal (Individualized)  Outcome: Progressing  Goal: Absence of Hospital-Acquired Illness or Injury  Outcome: Progressing  Goal: Optimal Comfort and Wellbeing  Outcome: Progressing  Goal: Readiness for Transition of Care  Outcome: Progressing     Problem: Seizure, Active Management  Goal: Absence of Seizure/Seizure-Related Injury  Outcome: Progressing     Problem: Fall Injury Risk  Goal: Absence of Fall and Fall-Related Injury  Outcome: Progressing       POC reviewed and updated with the patient/mother. Questions regarding POC were encouraged and addressed with the patient.  VSS, see flow-sheets. Tele maintained per order.   Patient is AO X 4 at this time. Continuous EEG in place. Sleep deprivation orders in place until 0700. See orders for details. Fall/safety precautions maintained, no signs of injury noted during shift. Seizure precautions maintained. NAEON. Upon exiting room, patient's bed locked in low position, side rails up x 4, bed alarm refused, with call light within reach. Instructed patient to call staff for assistance, verbalized understanding.  No acute signs of distress noted at this time.

## 2024-08-10 NOTE — PLAN OF CARE
Problem: Adult Inpatient Plan of Care  Goal: Plan of Care Review  8/10/2024 1708 by An Jenkins RN  Outcome: Progressing  8/10/2024 1509 by An Jenkins RN  Outcome: Progressing  Goal: Patient-Specific Goal (Individualized)  8/10/2024 1708 by An Jenkins RN  Outcome: Progressing  8/10/2024 1509 by An Jenkins RN  Outcome: Progressing  Goal: Absence of Hospital-Acquired Illness or Injury  8/10/2024 1708 by An Jenkins RN  Outcome: Progressing  8/10/2024 1509 by An Jenkins RN  Outcome: Progressing  Goal: Optimal Comfort and Wellbeing  8/10/2024 1708 by An Jenkins RN  Outcome: Progressing  8/10/2024 1509 by An Jenkins RN  Outcome: Progressing  Goal: Readiness for Transition of Care  8/10/2024 1708 by An Jenkins RN  Outcome: Progressing  8/10/2024 1509 by An Jenkins RN  Outcome: Progressing     Problem: Seizure, Active Management  Goal: Absence of Seizure/Seizure-Related Injury  8/10/2024 1708 by An Jenkins RN  Outcome: Progressing  8/10/2024 1509 by An Jenkins RN  Outcome: Progressing     Problem: Fall Injury Risk  Goal: Absence of Fall and Fall-Related Injury  8/10/2024 1708 by An Jenkins RN  Outcome: Progressing  8/10/2024 1509 by An Jenkins RN  Outcome: Progressing

## 2024-08-11 PROCEDURE — S4991 NICOTINE PATCH NONLEGEND: HCPCS

## 2024-08-11 PROCEDURE — 25000003 PHARM REV CODE 250

## 2024-08-11 PROCEDURE — 95714 VEEG EA 12-26 HR UNMNTR: CPT

## 2024-08-11 PROCEDURE — 94761 N-INVAS EAR/PLS OXIMETRY MLT: CPT

## 2024-08-11 PROCEDURE — 11000001 HC ACUTE MED/SURG PRIVATE ROOM

## 2024-08-11 RX ADMIN — NICOTINE 1 PATCH: 14 PATCH, EXTENDED RELEASE TRANSDERMAL at 09:08

## 2024-08-11 NOTE — PLAN OF CARE
Problem: Adult Inpatient Plan of Care  Goal: Plan of Care Review  Outcome: Progressing  Goal: Patient-Specific Goal (Individualized)  Outcome: Progressing  Goal: Absence of Hospital-Acquired Illness or Injury  Outcome: Progressing  Intervention: Identify and Manage Fall Risk  Flowsheets (Taken 8/11/2024 0441)  Safety Promotion/Fall Prevention:   assistive device/personal item within reach   bed alarm refused   Fall Risk reviewed with patient/family   family expresses understanding of fall risk and prevention   family to remain at bedside   medications reviewed   nonskid shoes/socks when out of bed   side rails raised x 2  Goal: Optimal Comfort and Wellbeing  Outcome: Progressing  Intervention: Monitor Pain and Promote Comfort  Flowsheets (Taken 8/11/2024 0441)  Pain Management Interventions: quiet environment facilitated     Problem: Seizure, Active Management  Goal: Absence of Seizure/Seizure-Related Injury  Outcome: Progressing  Intervention: Prevent Seizure-Related Injury  Flowsheets (Taken 8/11/2024 0441)  Seizure Precautions:   activity supervised   clutter-free environment maintained   emergency equipment at bedside   side rails padded   soft boundaries provided

## 2024-08-11 NOTE — PROCEDURES
DATE: 8/9/24    EEG NUMBER:  EMU -4    REFERRING PHYSICIAN:  Dr. Amaya     This EEG was performed to assess for evidence of underlying epilepsy.     ELECTROENCEPHALOGRAM REPORT     METHODOLOGY:  Electroencephalographic (EEG) recording is with electrodes placed according to the International 10-20 placement system.  Thirty two (32) channels of digital signal are simultaneously recorded from the scalp and may include EKG, EMG, and/or eye monitors.   Recording band pass was 0.1 to 512 hz.  Digital video recording of the patient is simultaneously recorded with the EEG.  The staff report clinical symptoms and may press an event button when the patient has symptoms of clinical interest to the treating physicians.  EEG and video recording is stored and archived in digital format.  The entire recording is visually reviewed, and the times identified by computer analysis as being spikes or seizures are reviewed again.  Activation procedures which include photic stimulation, hyperventilation and instructing patients to perform simple task are done in selected patients.   Compresses spectral analysis (CSA) is also performed on the activity recorded from each individual channel.  This is displayed as a power display of frequencies from 0 to 30 Hz over time.   The CSA analysis is done and displayed continuously.  This is reviewed for asymmetries in power between homologous areas of the scalp and for presence of changes in power which can be seen when seizures occur.  Sections of suspected abnormalities on the CSA is then compared with the original EEG recording.                [employer] software was also utilized in the review of this study.  This software suite analyzes the EEG recording in multiple domains.  Coherence and rhythmicity is computed to identify EEG sections which may contain organized seizures.  Each channel undergoes analysis to detect presence of spike and sharp waves which have special and  morphological characteristic of epileptic activity.  The routine EEG recording is converted from spacial into frequency domain.  This is then displayed comparing homologous areas to identify areas of significant asymmetry.  Algorithm to identify non-cortically generated artifact is used to separate eye movement, EMG and other artifact from the EEG.     Recording times   Start August 9, 2024 at hour 7 minute 0 seconds 55  End on August 10, 2024 at hours 7 minute 0 seconds 2   The total time of EEG recording for the study was 23 hours and 58 minutes    EEG FINDINGS:  The recording was obtained with a number of standard bipolar and referential montages during wakefulness, drowsiness and sleep.  In the alert state, the posterior background rhythm was a symmetric, well-modulated 9 to 10 Hz alpha rhythm, which reacted symmetrically to eye opening.  Intermittent photic stimulation evoked symmetric posterior driving responses.  Hyperventilation produced physiological slowing.  No abnormalities were activated by photic stimulation or hyperventilation.  During drowsiness, the background rhythm waxed and waned and there were periods of slowing.  During stage II sleep, symmetric V waves and sleep spindles were noted.  There were no focal abnormalities.  There were no interictal epileptiform abnormalities and no clinical or electrographic seizures were recorded.    The EKG channel revealed a sinus rhythm.     IMPRESSION:  This is a normal EEG during wakefulness, drowsiness and sleep.     CLINICAL CORRELATION:  The patient is a  38-year-old male who is being evaluated for episodes of loss of consciousness.  The patient is currently not maintained on any antiseizure medications. This is a normal EEG during wakefulness, drowsiness and sleep.  There is no evidence for neither cortical dysfunction nor an epileptic process on this recording.  No seizures were recorded during this study.

## 2024-08-11 NOTE — PROCEDURES
DATE: 8/10/24    EEG NUMBER:  EMU -5    REFERRING PHYSICIAN:  Dr. Amaya     This EEG was performed to assess for evidence of underlying epilepsy.     ELECTROENCEPHALOGRAM REPORT     METHODOLOGY:  Electroencephalographic (EEG) recording is with electrodes placed according to the International 10-20 placement system.  Thirty two (32) channels of digital signal are simultaneously recorded from the scalp and may include EKG, EMG, and/or eye monitors.   Recording band pass was 0.1 to 512 hz.  Digital video recording of the patient is simultaneously recorded with the EEG.  The staff report clinical symptoms and may press an event button when the patient has symptoms of clinical interest to the treating physicians.  EEG and video recording is stored and archived in digital format.  The entire recording is visually reviewed, and the times identified by computer analysis as being spikes or seizures are reviewed again.  Activation procedures which include photic stimulation, hyperventilation and instructing patients to perform simple task are done in selected patients.   Compresses spectral analysis (CSA) is also performed on the activity recorded from each individual channel.  This is displayed as a power display of frequencies from 0 to 30 Hz over time.   The CSA analysis is done and displayed continuously.  This is reviewed for asymmetries in power between homologous areas of the scalp and for presence of changes in power which can be seen when seizures occur.  Sections of suspected abnormalities on the CSA is then compared with the original EEG recording.                [employer] software was also utilized in the review of this study.  This software suite analyzes the EEG recording in multiple domains.  Coherence and rhythmicity is computed to identify EEG sections which may contain organized seizures.  Each channel undergoes analysis to detect presence of spike and sharp waves which have special and  morphological characteristic of epileptic activity.  The routine EEG recording is converted from spacial into frequency domain.  This is then displayed comparing homologous areas to identify areas of significant asymmetry.  Algorithm to identify non-cortically generated artifact is used to separate eye movement, EMG and other artifact from the EEG.     Recording times   Start August 10, 2024 at hour 7 minute 0 seconds 38  End on August 11, 2024 at hours 7 minute 0 seconds 1   The total time of EEG recording for the study was 23 hours and 59 minutes    EEG FINDINGS:  The recording was obtained with a number of standard bipolar and referential montages during wakefulness, drowsiness and sleep.  In the alert state, the posterior background rhythm was a symmetric, well-modulated 9 to 10 Hz alpha rhythm, which reacted symmetrically to eye opening.  I During drowsiness, the background rhythm waxed and waned and there were periods of slowing.  During stage II sleep, symmetric V waves and sleep spindles were noted.  There were no focal abnormalities.  There were no interictal epileptiform abnormalities and no clinical or electrographic seizures were recorded.    The EKG channel revealed a sinus rhythm.     IMPRESSION:  This is a normal EEG during wakefulness, drowsiness and sleep.     CLINICAL CORRELATION:  The patient is a  38-year-old male who is being evaluated for episodes of loss of consciousness.  The patient is currently not maintained on any antiseizure medications. This is a normal EEG during wakefulness, drowsiness and sleep.  There is no evidence for neither cortical dysfunction nor an epileptic process on this recording.  No seizures were recorded during this study.

## 2024-08-11 NOTE — PROCEDURES
"[facility] Neurosurgery ([facility])  Neurology-Epilepsy  Progress Note    Patient Name: Abilio Lorenzana  MRN: [id number]  Admission Date: 8/6/2024  Hospital Length of Stay: 4 days  Code Status: Full Code   Attending Provider: Tyree Oneill MD  Primary Care Physician: No, Primary Doctor   Principal Problem:Seizure-like activity    Subjective:     Hospital Course:   8/6>8/7: No  push buttons or seizures captured. EEG without epileptiform activity or electrographic seizures. Plan to sleep deprive and discontinue carbamazepine.   8/7>8/8: No events overnight. EEG without epileptiform activity or seizures. Patient completed sleep deprivation yesterday with provoking medication this morning.   8/8>8/9: No seizure or push buttons. EEG normal. Plan for sleep deprivation with seizure provoking medications tomorrow morning.   8/9>8/10 - no new events.     No new subjective & objective note has been filed under this hospital service since the last note was generated.    Assessment and Plan:     * Seizure-like activity  Abilio Lorenzana is a 38 year old male with a history of smoking who presents per Dr. Amaya for capture of seizure events and optimization of medications. He experiences 1-2 episodes per month where he feels "light headed" and will proceed to pass out and convulse. Prior EEG 4/2024 was normal as well as MRI 1/2020. He is currently on keppra 750 mg BID and carbamazepine 300 mg BID.     Plan:  - Continuous EEG monitoring  - Discontinued keppra (8/6) and carbamazepine (8/6 down to 100 mg BID, 8/7 dc'd)  - Carbamazepine level< 1.9, Keppra<1.0  - Sleep deprivation tonight with benadryl/tramadol tomorrow morning  - Patient has not experienced an event yet though suspect non epileptic spells; will discuss with neuropsychology for possible consult on Monday   - For seizure > 5 minute notify epilepsy on call  - Ativan 2 mg prn  - Oxygen and suction at bedside  - Continuous pulse oximetry   - Telemetry     Nicotine " dependence  - Discussed smoking cessation and will start a nicotine patch while admitted.         VTE Risk Mitigation (From admission, onward)           Ordered     Place sequential compression device  Until discontinued         08/06/24 1329     IP VTE LOW RISK PATIENT  Once         08/06/24 1329                    Tyree Oneill MD  Neurology-Epilepsy  [facility] - Neurosurgery ([facility])

## 2024-08-12 ENCOUNTER — TELEPHONE (OUTPATIENT)
Dept: NEUROLOGY | Facility: CLINIC | Age: 39
End: 2024-08-12
Payer: COMMERCIAL

## 2024-08-12 VITALS
DIASTOLIC BLOOD PRESSURE: 54 MMHG | SYSTOLIC BLOOD PRESSURE: 99 MMHG | RESPIRATION RATE: 18 BRPM | BODY MASS INDEX: 19.65 KG/M2 | WEIGHT: 132.69 LBS | OXYGEN SATURATION: 97 % | HEIGHT: 69 IN | TEMPERATURE: 98 F | HEART RATE: 77 BPM

## 2024-08-12 PROBLEM — F44.9 CONVERSION DISORDER: Status: ACTIVE | Noted: 2024-08-12

## 2024-08-12 PROCEDURE — 94761 N-INVAS EAR/PLS OXIMETRY MLT: CPT

## 2024-08-12 PROCEDURE — S4991 NICOTINE PATCH NONLEGEND: HCPCS

## 2024-08-12 PROCEDURE — 90791 PSYCH DIAGNOSTIC EVALUATION: CPT | Mod: ,,, | Performed by: CLINICAL NEUROPSYCHOLOGIST

## 2024-08-12 PROCEDURE — 25000003 PHARM REV CODE 250

## 2024-08-12 RX ADMIN — NICOTINE 1 PATCH: 14 PATCH, EXTENDED RELEASE TRANSDERMAL at 09:08

## 2024-08-12 NOTE — DISCHARGE SUMMARY
"[facility] include [facility] beds at the [facility]  Neurology-Epilepsy  Discharge Summary      Patient Name: Abilio Lorenzana  MRN: [id number]  Admission Date: 8/6/2024  Hospital Length of Stay: 6 days  Discharge Date and Time:  08/12/2024 1:32 PM  Attending Physician: Aaliyah att. providers found   Discharging Provider: Emma Pate PA-C  Primary Care Physician: Aaliyah, Primary Doctor    HPI:   Abilio Lorenzana is a 38 year old male with a history of smoking who presents per Dr. Amaya for capture of seizure events and optimization of medications. His first event was back in 2011 when working at [employer]. At the time he felt "lightheaded" and short of breath. He sat down and fell back and the next thing he remembered was waking up in the hospital. Since then he experiences 1-2 events per month. Sometimes he feels tingling all over his body and he is confused until the next day. He denies tongue biting or bowel/bladder incontinence. The last episode was July 26th. He has an EEG 4/2024 which was normal. MRI brain epilepsy protocol 1/14/2020 without acute findings other than left maxillary sinusitis. He is currently on keppra 750 mg BID and carbamazepine 300 mg BID. He took his medications on the morning of arrival.     * No surgery found *     Indwelling Lines/Drains at time of discharge:   Lines/Drains/Airways       None                 Hospital Course:   8/6>8/7: No  push buttons or seizures captured. EEG without epileptiform activity or electrographic seizures. Plan to sleep deprive and discontinue carbamazepine.   8/7>8/8: No events overnight. EEG without epileptiform activity or seizures. Patient completed sleep deprivation yesterday with provoking medication this morning.   8/8>8/9: No seizure or push buttons. EEG normal. Plan for sleep deprivation with seizure provoking medications tomorrow morning.   8/9>8/10: no new events  8/10>8/11: no new events  8/11>8/12: No typical events captured overnight, EEG normal since admission. Evaluated by " "Neuropsychology, recommend outpatient talk therapy/counseling. Given normal EEG during entirety of admission, discontinue Levetiracetam and Carbamazepine. Consider outpatient ambulatory EEG. Continue outpatient follow up with Dr. Amaya for further management.     Goals of Care Treatment Preferences:  Code Status: Full Code      Consults:     Significant Labs: All pertinent lab results from the past 24 hours have been reviewed.    Significant Studies: I have reviewed all pertinent imaging results/findings within the past 24 hours.    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 Amaya for capture of seizure events and optimization of medications. He experiences 1-2 episodes per month where he feels "light headed" and will proceed to pass out and convulse. Prior EEG 4/2024 was normal as well as MRI 1/2020. He is currently on keppra 750 mg BID and carbamazepine 300 mg BID.     Plan:  - Discontinue EEG monitoring - no typical events captured overnight, EEG normal since admission  - Evaluated by Neuropsychology, recommend outpatient talk therapy/counseling  - Given normal EEG during entirety of admission, discontinue Levetiracetam and Carbamazepine  - Consider outpatient ambulatory EEG  - Continue outpatient follow up with Dr. Amaya for further management    Plan of care discussed in detail with patient, Mother at bedside.        Discharged Condition: stable    Disposition: Home or Self Care    Follow Up:   Follow-up Information       Amie Amaya MD Follow up in 1 month(s).  "   Specialty: Neurology  Contact information:  [address] [facility] [address]  [phone]                           Patient Instructions:      Diet Adult Regular     Notify your health care provider if you experience any of the following:  increased confusion or weakness     Notify your health care provider if you experience any of the following:  persistent dizziness, light-headedness, or visual disturbances     Notify your health care provider if you experience any of the following:  worsening rash     Notify your health care provider if you experience any of the following:  severe persistent headache     Notify your health care provider if you experience any of the following:  difficulty breathing or increased cough     Reason for not Ordering Smoking Cessation Referral     Order Specific Question Answer Comments   Reason for not ordering: Patient refused      Reason for not Prescribing Nicotine Replacement     Order Specific Question Answer Comments   Reason for not Prescribing: Patient refused      Activity as tolerated   Order Comments: No driving, bathing alone, swimming, or any event during which sudden loss of awareness could harm patient or others until 6 months event free, or cleared by Neurology       Medications:  Reconciled Home Medications:      Medication List        STOP taking these medications      carBAMazepine 300 MG Cm12  Commonly known as: CARBATROL     levETIRAcetam 750 MG Tab  Commonly known as: KEPPRA            Time spent on the discharge of patient: 60 minutes    Emma Pate PA-C  Neurology-Epilepsy  [facility]  Staff: Dr. Oneill

## 2024-08-12 NOTE — TELEPHONE ENCOUNTER
Order for routine + 72 hr vEEG faxed to [employer], [phone] (# [phone])    Your fax has been successfully sent to [phone] at [phone].  ------------------------------------------------------------  From: [id number]  ------------------------------------------------------------  8/12/2024 3:00:09 PM Transmission Record   Sent to [phone] with remote ID "   Result: (0/339;0/0) Success   Page record: 1 - 14   Elapsed time: 05:03 on channel 9

## 2024-08-12 NOTE — TELEPHONE ENCOUNTER
----- Message from Amie Amaya MD sent at 8/12/2024  1:15 PM CDT -----  Jose renner, could we get this patient set up with a 72 hour neuralogix EEG? Video helpful tele is optional  Thanks

## 2024-08-12 NOTE — PLAN OF CARE
[facility] - Neurosurgery (Hospital)  Discharge Final Note    Primary Care Provider: Aaliyah, Primary Doctor    Expected Discharge Date: 8/12/2024    Patient discharged home.  The patient did not have any home needs.  Family provided transportation home.    Final Discharge Note (most recent)       Final Note - 08/12/24 1311          Final Note    Assessment Type Final Discharge Note     Anticipated Discharge Disposition Home or Self Care        Post-Acute Status    Post-Acute Authorization Other     Other Status No Post-Acute Service Needs     Discharge Delays None known at this time                     Important Message from Medicare             Contact Info       Amie Amaya MD   Specialty: Neurology    [address] [address]   Phone: [phone]       Next Steps: Follow up in 1 month(s)

## 2024-08-12 NOTE — CONSULTS
"NEUROPSYCHOLOGY INPATIENT CONSULT - CONFIDENTIAL    Referring Provider: Fabio Amaya*   Medical Necessity: Evaluate mood and personality functioning to assist in differential diagnosis of seizure-like episodes.  Date Conducted:  2024  Present At Visit: the patient and his mother  Billin = 40 minutes  Referral Diagnoses: F44.4 Conversion Disorder   Consent: The patient expressed an understanding of the purpose of the evaluation and consented to all procedures. He additionally provided consent to speak with his mother, who was present during part of the appointment today. We discussed the limits of confidentiality and discussed an emergency plan.    ASSESSMENT & PLAN   Abilio Lorenzana is a 38 year old male with a history of smoking who presents per Dr. Amaya for capture of seizure events and optimization of medications. His first event was back in  when working at [employer]. At the time he felt "lightheaded" and short of breath. He sat down and fell back and the next thing he remembered was waking up in the hospital. Since then he experiences 1-2 events per month. Sometimes he feels tingling all over his body and he is confused until the next day. He denies tongue biting or bowel/bladder incontinence. The last episode was . He has an EEG 2024 which was normal. MRI brain epilepsy protocol 2020 without acute findings other than left maxillary sinusitis. He is currently on keppra 750 mg BID and carbamazepine 300 mg BID. While no events have been captured during this admission, the team is concerned for possible psychogenic nonepileptic events (PNEE). The present evaluation was therefore requested to assess for risk factors associated with this diagnosis.     Overall, this patient's history is remarkable for one previous episode of depression that "came out of nowhere" when he was 15 years old which was treated with medication. He denied current depression. He reported " "some current worry/anxiety with occasional racing thoughts which interfere with sleep "some nights," though he does not find this particularly problematic. This information indicates just slightly increased risk for PNEE. However, a lack of identifiable risk factors does not rule out the possibility of a PNEE diagnosis. As such, the following treatment plan was discussed with the patient:      Psychoeducation - The patient was provided with psychoeducation on PNEE/conversion disorder and given a handout.   Psychotherapy - Local mental health treatment options were discussed. The patient was open to starting talk therapy/counseling if he is indeed diagnosed with PNEE.     Medications - Continue taking medications as prescribed.   Medical/Neurology Follow-up - Continued medical assessment and follow-up as recommended by Neurology. It is important to note that a diagnosis or impression of Conversion Disorder or Somatization Disorder does not rule other medical symptoms in need of assessment/management.   Follow-up visit with Neuropsychology - Not indicated. The patient was encouraged to contact me if he believed I could be of further assistance.     Problem List Items Addressed This Visit          Neuro    * (Principal) Seizure-like activity    Relevant Orders    Admit to Inpatient    EMU Monitoring (Completed)       Psychiatric    Conversion disorder - Primary     Other Visit Diagnoses       Seizure        Relevant Orders    EKG, 12 - Lead (Completed)          Thank you for allowing me to assist in Mr. Abilio Lorenzana's care. If you have any questions, please contact me at [phone].      Beti Higuera, Ph.D., ABPP  Board Certified in Clinical Neuropsychology   [facility] - Department of Neurology    SUBJECTIVE     Current Psychiatric Symptoms  Mood: "calm"   Aminah/Hypomania: no  Depression: no.    Current Suicidal ideation, intention, or plan: no  Apathy/Indifference: no  Anxiety: yes - can be a big " "worrier. can't always fall asleep right away due to racing thoughts. Does not see this as problematic, however.    Stress & Recent Stressors: low  Neurovegetative Sxs:  Appetite: when he has food given to him, he eats it. Otherwise if it's up to him to manage, he won't eat anything. Eating 2 or 3 meals a day.   Sleep: sometimes takes him a long time to fall asleep. Waking up a lot. Dreams waking him up. 4 to 5 hours a night   Energy: level. Sometimes feels excited and gung ho for the moment, other times his energy maybe down a little bit. Takes an hour long nap each day which he believes is because.   Hallucinations: no  Delusional/Paranoid Thinking: no  Impulsivity: no  Compulsivity: no  Disinhibition: no  Irritability/Agitation: no  Aggression: no    Past Psychiatric History  Prior Diagnoses: depressive episode that came out of nowhere at the age of 15 years. Treated with medication (unsure of the name)  History of Trauma: no  History of Abuse: no  History of Suicide Attempts: no  Self-injurious Behaviors: no  Homicidal Attempts: no    Psychiatric Treatment  Psychiatric Hospitalization(s): no  IOP Programs: no  Medication(s):   Current: no  Previous: took a medicine at the age of 15 for depression  Follows with Psychiatry: no  Psychotherapy/Counseling: no    Substance Use History  Social History     Tobacco Use    Smoking status: Every Day     Current packs/day: 0.25     Types: Cigarettes    Smokeless tobacco: Never   Substance and Sexual Activity    Alcohol use: Not Currently    Drug use: Not on file    Sexual activity: Not on file     History of abuse/overuse: no  History of treatment: no    Psychosocial  Relationship Status: Single  Children: no  Years of Education: HS  Work Status:  at [employer]; however because of seizures, he quit  Support System: "it's okay." Sister is best support, mom too.   Living Arrangements: Lives with his mother, stepfather, and brother.     OBJECTIVE     MENTAL " "STATUS AND OBSERVATIONS   Appearance: Adequate grooming/hygiene.   Alertness: Attentive and alert.   Gait: Unable to assess  Motor movements/mannerisms: Unable to assess  Speech/language: Normal in rate, rhythm, tone, and volume. No significant word finding difficulty observed. Comprehension was normal.  Mood/Affect: The patients stated mood was "calm." Affect was congruent with stated mood.   Interpersonal Behavior: Rapport was quickly and easily established   Suicidality/Homicidality: Denied  Hallucinations/Delusions: None evidenced or endorsed  Thought Content & Processes:Thoughts seemed logical and goal-directed.   Insight & Judgment: Appropriate  Participation in Clinical Interview: Full + collateral    "

## 2024-08-12 NOTE — PLAN OF CARE
Problem: Adult Inpatient Plan of Care  Goal: Plan of Care Review  Outcome: Progressing  Goal: Optimal Comfort and Wellbeing  Outcome: Progressing  Intervention: Monitor Pain and Promote Comfort  Flowsheets (Taken 8/12/2024 0405)  Pain Management Interventions:   awakened for pain meds per patient request   pillow support provided   position adjusted     Problem: Seizure, Active Management  Goal: Absence of Seizure/Seizure-Related Injury  Outcome: Progressing  Intervention: Prevent Seizure-Related Injury  Flowsheets (Taken 8/12/2024 0405)  Seizure Precautions:   activity supervised   clutter-free environment maintained   emergency equipment at bedside   side rails padded   soft boundaries provided

## 2024-08-12 NOTE — ASSESSMENT & PLAN NOTE
"Conversion disorder  Abilio Lorenzana is a 38 year old male with a history of smoking who presents per Dr. Amaya for capture of seizure events and optimization of medications. He experiences 1-2 episodes per month where he feels "light headed" and will proceed to pass out and convulse. Prior EEG 4/2024 was normal as well as MRI 1/2020. He is currently on keppra 750 mg BID and carbamazepine 300 mg BID.     Plan:  - Discontinue EEG monitoring - no typical events captured overnight, EEG normal since admission  - Evaluated by Neuropsychology, recommend outpatient talk therapy/counseling  - Given normal EEG during entirety of admission, discontinue Levetiracetam and Carbamazepine  - Consider outpatient ambulatory EEG  - Continue outpatient follow up with Dr. Amaya for further management    Plan of care discussed in detail with patient, Mother at bedside.  "

## 2024-08-15 ENCOUNTER — PATIENT MESSAGE (OUTPATIENT)
Dept: NEUROLOGY | Facility: CLINIC | Age: 39
End: 2024-08-15
Payer: COMMERCIAL

## 2024-08-22 ENCOUNTER — TELEPHONE (OUTPATIENT)
Dept: NEUROLOGY | Facility: CLINIC | Age: 39
End: 2024-08-22
Payer: COMMERCIAL

## 2024-08-22 NOTE — TELEPHONE ENCOUNTER
LCSW placed call to patient [phone] and is not a working number.  LCSW left generic voicemail .  [phone]     Requested call back or message in portal .

## 2024-09-03 ENCOUNTER — PATIENT MESSAGE (OUTPATIENT)
Dept: NEUROLOGY | Facility: CLINIC | Age: 39
End: 2024-09-03
Payer: COMMERCIAL

## 2024-09-25 ENCOUNTER — PATIENT MESSAGE (OUTPATIENT)
Dept: NEUROLOGY | Facility: CLINIC | Age: 39
End: 2024-09-25
Payer: COMMERCIAL

## 2024-09-27 ENCOUNTER — OFFICE VISIT (OUTPATIENT)
Dept: NEUROLOGY | Facility: CLINIC | Age: 39
End: 2024-09-27
Payer: COMMERCIAL

## 2024-09-27 ENCOUNTER — PATIENT MESSAGE (OUTPATIENT)
Dept: NEUROLOGY | Facility: CLINIC | Age: 39
End: 2024-09-27

## 2024-09-27 DIAGNOSIS — G40.909 SEIZURE DISORDER: Primary | ICD-10-CM

## 2024-09-27 PROCEDURE — 99213 OFFICE O/P EST LOW 20 MIN: CPT | Mod: 95,,,

## 2024-09-27 NOTE — PROGRESS NOTES
Established Patient - Audio Only Telehealth Visit     The patient location is: home  The chief complaint leading to consultation is: epilepsy  Visit type: Virtual visit with audio only (telephone)  Total time spent with patient: 5min    The reason for the audio only service rather than synchronous audio and video virtual visit was related to technical difficulties or patient preference/necessity.     Each patient to whom I provide medical services by telemedicine is:  (1) informed of the relationship between the physician and patient and the respective role of any other health care provider with respect to management of the patient; and (2) notified that they may decline to receive medical services by telemedicine and may withdraw from such care at any time. Patient verbally consented to receive this service via voice-only telephone call.       HPI:   Interval Events/ROS 12/8/2024:    Current AED/SEs: none -> discontinued during EMU admission  Breakthrough seizures/events: none    Otherwise, no fever, no cold symptoms, no headache, no changes in vision, no new weakness, no chest pain, no shortness of breath, no nausea, no vomiting, no diarrhea, no constipation, no problems walking.    Recent Labs   Lab 04/11/24  1641 08/06/24  1542   Levetiracetam Lvl 14.4 <1.0   Oxcarbazepine <1 L  --    Carbamazepine Lvl 7.7 <1.9 L       Assessment and plan:    38yo man w/ suspected non-epileptic events. Normal EEG throughout EMU admission 8/2024 while off medications -> LEV and CBZ were subsequently discontinued. No further events since that time. Ambulatory EEG if episodes recur. Patient is comfortable with plan. All questions and concerns are addressed at this time.     Priscila Kelly PA-C   Neurology-Epilepsy  [facility]    Collaborating physician, Dr. Pastora Amaya, was available during today's encounter.     I spent approximately 20 minutes on the day of this encounter preparing to see the  patient, obtaining and reviewing history and results, performing a medically appropriate exam, counseling and educating the patient/family/caregiver, documenting clinical information, coordinating care, and ordering medications, tests, procedures, and referrals.                          This service was not originating from a related E/M service provided within the previous 7 days nor will  to an E/M service or procedure within the next 24 hours or my soonest available appointment.  Prevailing standard of care was able to be met in this audio-only visit.

## 2024-09-27 NOTE — LETTER
September 27, 2024      [facility] - Neurology 7th Fl  [address]  [facility] [address]  Phone: [phone]  Fax: [phone]       Patient: Abilio Lorenzana   YOB: 1985  Date of Visit: 09/27/2024    To Whom It May Concern:    Abilio Lorenzana is under my care at [facility] for a neurological condition. Due to this condition, the patient experiences unpredictable seizures characterized by decreased responsiveness and abnormal movements, and he has subsequently been unable to maintain any form of meaningful employment at this time. I appreciate your assistance in caring for this patient. If you have any questions or concerns, or if I can be of further assistance, please do not hesitate to contact me.    Sincerely,    Priscila Kelly PA-C   Neurology-Epilepsy  [facility]